# Patient Record
Sex: FEMALE | Race: WHITE | NOT HISPANIC OR LATINO | Employment: OTHER | URBAN - METROPOLITAN AREA
[De-identification: names, ages, dates, MRNs, and addresses within clinical notes are randomized per-mention and may not be internally consistent; named-entity substitution may affect disease eponyms.]

---

## 2021-03-12 ENCOUNTER — OFFICE VISIT (OUTPATIENT)
Dept: NEUROSURGERY | Facility: CLINIC | Age: 86
End: 2021-03-12

## 2021-03-12 VITALS
BODY MASS INDEX: 29.59 KG/M2 | TEMPERATURE: 96.9 F | WEIGHT: 167 LBS | DIASTOLIC BLOOD PRESSURE: 92 MMHG | HEIGHT: 63 IN | SYSTOLIC BLOOD PRESSURE: 142 MMHG

## 2021-03-12 DIAGNOSIS — S32.040A COMPRESSION FRACTURE OF L4 VERTEBRA, INITIAL ENCOUNTER (HCC): Primary | ICD-10-CM

## 2021-03-12 DIAGNOSIS — M54.50 ACUTE MIDLINE LOW BACK PAIN WITHOUT SCIATICA: ICD-10-CM

## 2021-03-12 PROCEDURE — 99203 OFFICE O/P NEW LOW 30 MIN: CPT | Performed by: PHYSICIAN ASSISTANT

## 2021-03-12 RX ORDER — ALLOPURINOL 100 MG/1
100 TABLET ORAL 2 TIMES DAILY
COMMUNITY
Start: 2021-02-04

## 2021-03-12 RX ORDER — MEMANTINE HYDROCHLORIDE 10 MG/1
10 TABLET ORAL DAILY
COMMUNITY
Start: 2021-02-04

## 2021-03-12 RX ORDER — HYDROCODONE BITARTRATE AND ACETAMINOPHEN 5; 325 MG/1; MG/1
1 TABLET ORAL EVERY 8 HOURS PRN
Qty: 25 TABLET | Refills: 0 | Status: SHIPPED | OUTPATIENT
Start: 2021-03-12 | End: 2021-03-22 | Stop reason: SDUPTHER

## 2021-03-12 RX ORDER — PREDNISONE 10 MG/1
10 TABLET ORAL DAILY
COMMUNITY
Start: 2021-02-04

## 2021-03-12 RX ORDER — NITROFURANTOIN MACROCRYSTALS 100 MG/1
100 CAPSULE ORAL DAILY
COMMUNITY
Start: 2021-02-04

## 2021-03-12 RX ORDER — ATORVASTATIN CALCIUM 40 MG/1
40 TABLET, FILM COATED ORAL NIGHTLY
COMMUNITY

## 2021-03-12 RX ORDER — HYDROCODONE BITARTRATE AND ACETAMINOPHEN 5; 325 MG/1; MG/1
TABLET ORAL
COMMUNITY
Start: 2021-03-09 | End: 2023-01-10

## 2021-03-12 RX ORDER — GLIPIZIDE 5 MG/1
5 TABLET ORAL DAILY
COMMUNITY
Start: 2021-02-04

## 2021-03-12 RX ORDER — POTASSIUM CHLORIDE 750 MG/1
10 CAPSULE, EXTENDED RELEASE ORAL DAILY
COMMUNITY

## 2021-03-16 ENCOUNTER — TELEPHONE (OUTPATIENT)
Dept: NEUROSURGERY | Facility: CLINIC | Age: 86
End: 2021-03-16

## 2021-03-16 NOTE — TELEPHONE ENCOUNTER
Caller: MAYELIN CORONA    Relationship to patient: DAUGHTER    Best call back number: [phone]    Patient is needing: PT HAD CT SCAN DONE AT [facility] IN [place] IN THE ED.  PTS FAMILY TRIED TO OBTAIN THESE MEDICAL RECORDS AND WERE UNABLE TO OBTAIN THEM WITHOUT POA.  [facility] STATED. THEY NEEDED A REQUEST FROM THE OFFICE CAN BE FAXED @[phone].  [facility] STATED THEY WOULD BE ABLE TO GET THEM TO THE OFFICE BY PTS APPT TIME TOMORROW.      PLEASE ADVISE  THANK YOU

## 2021-03-17 ENCOUNTER — OFFICE VISIT (OUTPATIENT)
Dept: NEUROSURGERY | Facility: CLINIC | Age: 86
End: 2021-03-17

## 2021-03-17 VITALS
SYSTOLIC BLOOD PRESSURE: 122 MMHG | HEIGHT: 63 IN | DIASTOLIC BLOOD PRESSURE: 82 MMHG | WEIGHT: 164 LBS | BODY MASS INDEX: 29.06 KG/M2 | TEMPERATURE: 97.8 F

## 2021-03-17 DIAGNOSIS — M54.50 ACUTE MIDLINE LOW BACK PAIN WITHOUT SCIATICA: ICD-10-CM

## 2021-03-17 DIAGNOSIS — S32.040A COMPRESSION FRACTURE OF L4 VERTEBRA, INITIAL ENCOUNTER (HCC): Primary | ICD-10-CM

## 2021-03-17 PROCEDURE — 99214 OFFICE O/P EST MOD 30 MIN: CPT | Performed by: PHYSICIAN ASSISTANT

## 2021-03-17 RX ORDER — SODIUM CHLORIDE 9 MG/ML
100 INJECTION, SOLUTION INTRAVENOUS CONTINUOUS
Status: CANCELLED | OUTPATIENT
Start: 2021-03-17

## 2021-03-17 RX ORDER — SODIUM CHLORIDE 0.9 % (FLUSH) 0.9 %
3-10 SYRINGE (ML) INJECTION AS NEEDED
Status: CANCELLED | OUTPATIENT
Start: 2021-03-17

## 2021-03-17 RX ORDER — SODIUM CHLORIDE 0.9 % (FLUSH) 0.9 %
3 SYRINGE (ML) INJECTION EVERY 12 HOURS SCHEDULED
Status: CANCELLED | OUTPATIENT
Start: 2021-03-17

## 2021-03-17 RX ORDER — FAMOTIDINE 10 MG
20 TABLET ORAL
Status: CANCELLED | OUTPATIENT
Start: 2021-03-17

## 2021-03-17 NOTE — PROGRESS NOTES
Patient: Justine Mancia  : 3/24/1933  Chart #: [id number]    Date of Service: 2021    CHIEF COMPLAINT: Back pain    History of Present Illness Ms. Mancia is seen in follow-up.  She is an 87-year-old woman who is known to Dr. Pyle's service for undergoing decompressive laminectomies L3-4, and L4-5 on 2013.  Remotely, () she underwent a lumbar fusion by Dr. Corley.  We do not have record of that.  About 3 weeks ago she developed increased low back pain.  No inciting accident or injury.  1 week ago, symptoms became severe prompting her to go to the emergency room.  A CT scan reportedly showed a compression deformity at the L4 vertebral body felt to be acute/subacute.  She was sent home with pain medication.  She continues to complain of significant low back pain.  She has unable to go without pain medication.  She denies pain, numbness or weakness into the lower extremities.  Symptoms are worse with activity-specifically bending, twisting, or moving from sitting to standing.  She feels better when lying down.  No bowel or bladder difficulties.  She has no known history of osteoporosis.  She is on chronic daily prednisone for osteoarthritis.     Past Medical History:   Diagnosis Date   • Arthritis    • Diabetes mellitus (CMS/[facility])    • Gout    • Hx of bladder infections    • Hypertension    • Low back pain    • UTI (urinary tract infection)          Current Outpatient Medications:   •  allopurinol (ZYLOPRIM) 100 MG tablet, , Disp: , Rfl:   •  apixaban (Eliquis) 5 MG tablet tablet, Every 12 (Twelve) Hours., Disp: , Rfl:   •  atorvastatin (Lipitor) 40 MG tablet, Lipitor 40 mg tablet  Take 1 tablet every day by oral route at bedtime., Disp: , Rfl:   •  glipizide (GLUCOTROL) 5 MG tablet, , Disp: , Rfl:   •  HYDROcodone-acetaminophen (NORCO) 5-325 MG per tablet, , Disp: , Rfl:   •  HYDROcodone-acetaminophen (NORCO) 5-325 MG per tablet, Take 1 tablet by mouth Every 8 (Eight) Hours As Needed for Moderate Pain  "., Disp: 25 tablet, Rfl: 0  •  memantine (NAMENDA) 10 MG tablet, , Disp: , Rfl:   •  Metoprolol Tartrate (LOPRESSOR PO), metoprolol tartrate  50 mg. daily, Disp: , Rfl:   •  nitrofurantoin (MACRODANTIN) 100 MG capsule, , Disp: , Rfl:   •  potassium chloride (MICRO-K) 10 MEQ CR capsule, potassium chloride  10 mg. daily, Disp: , Rfl:   •  predniSONE (DELTASONE) 10 MG tablet, , Disp: , Rfl:     Past Surgical History:   Procedure Laterality Date   • APPENDECTOMY     • CERVICAL FUSION  1980's   • LUMBAR FUSION  1970's    Dr. Corley   • LUMBAR LAMINECTOMY  2013    L3/4 and L4/5; Dr. Mars Pyle   • OTHER SURGICAL HISTORY      bone spur removal - foot       Social History     Socioeconomic History   • Marital status:      Spouse name: Not on file   • Number of children: Not on file   • Years of education: Not on file   • Highest education level: Not on file   Tobacco Use   • Smoking status: Never Smoker   • Smokeless tobacco: Never Used   Substance and Sexual Activity   • Alcohol use: Never   • Drug use: Never   • Sexual activity: Defer         Review of Systems   Musculoskeletal: Positive for back pain.   All other systems reviewed and are negative.      Objective   Vital Signs: Blood pressure 122/82, temperature 97.8 °F (36.6 °C), height 160 cm (63\"), weight 74.4 kg (164 lb).  Physical Exam   Vitals and nursing note reviewed.   Constitutional:       General: She is not in acute distress.     Appearance: She is well-developed.   HENT:      Head: Normocephalic and atraumatic.   Eyes:      Pupils: Pupils are equal, round, and reactive to light.   Cardiovascular:      Heart sounds: Normal heart sounds.   Pulmonary:      Breath sounds: Normal breath sounds.   Psychiatric:         Behavior: Behavior normal.         Thought Content: Thought content normal.   Musculoskeletal:     Strength is intact in upper and lower extremities to direct testing. Tenderness observed in the low back.      Station and gait are normal. " Ambulates with rolling walker.   Neurologic:     Muscle tone is normal throughout.     Coordination is intact.     Sensation is intact to light touch throughout.     Patient is oriented to person, place, and time.         Independent review of radiographic imaging: MRI of the lumbar spine demonstrates compression deformity with increased STIR signal consistent with acute/subacute compression fracture. Reviewed with Dr Pyle and with patient in the room.     Assessment/Plan   Diagnosis:  1. Acute/subacute compression fracture L4  2. Presumed osteoporosis.   3. Anticoagulated     Medical Decision Making: Patient has been struggling with pain for several weeks. Last week symptoms were so severe, she went to the emergency department.  I have recommended kyphoplasty to address her severe pain. Dr Pyle has met with her today.  The general nature of the procedure, as well as risks, limitations, and complications were discussed. She will need to come off Eliquis in the perioperative period.     Diagnoses and all orders for this visit:    1. Compression fracture of L4 vertebra, initial encounter (CMS/[facility]) (Primary)    2. Acute midline low back pain without sciatica    3. BMI 29.0-29.9,adult                          Patient's Body mass index is 29.05 kg/m². BMI is above normal parameters. Recommendations include: exercise counseling and nutrition counseling.         Enid Prescott PA-C  Patient Care Team:  Amilcar Arroyo MD as PCP - General (Family Medicine)

## 2021-03-17 NOTE — H&P
Patient: Justine Mancia  : 3/24/1933  Chart #: [id number]    Date of Service: 2021    CHIEF COMPLAINT: Back pain    History of Present Illness Ms. Mancia is seen in follow-up.  She is an 87-year-old woman who is known to Dr. Pyle's service for undergoing decompressive laminectomies L3-4, and L4-5 on 2013.  Remotely, () she underwent a lumbar fusion by Dr. Corley.  We do not have record of that.  About 3 weeks ago she developed increased low back pain.  No inciting accident or injury.  1 week ago, symptoms became severe prompting her to go to the emergency room.  A CT scan reportedly showed a compression deformity at the L4 vertebral body felt to be acute/subacute.  She was sent home with pain medication.  She continues to complain of significant low back pain.  She has unable to go without pain medication.  She denies pain, numbness or weakness into the lower extremities.  Symptoms are worse with activity-specifically bending, twisting, or moving from sitting to standing.  She feels better when lying down.  No bowel or bladder difficulties.  She has no known history of osteoporosis.  She is on chronic daily prednisone for osteoarthritis.       Past Medical History:   Diagnosis Date   • Arthritis    • Diabetes mellitus (CMS/[facility])    • Gout    • Hx of bladder infections    • Hypertension    • Low back pain    • UTI (urinary tract infection)          Current Outpatient Medications:   •  allopurinol (ZYLOPRIM) 100 MG tablet, , Disp: , Rfl:   •  apixaban (Eliquis) 5 MG tablet tablet, Every 12 (Twelve) Hours., Disp: , Rfl:   •  atorvastatin (Lipitor) 40 MG tablet, Lipitor 40 mg tablet  Take 1 tablet every day by oral route at bedtime., Disp: , Rfl:   •  glipizide (GLUCOTROL) 5 MG tablet, , Disp: , Rfl:   •  HYDROcodone-acetaminophen (NORCO) 5-325 MG per tablet, , Disp: , Rfl:   •  HYDROcodone-acetaminophen (NORCO) 5-325 MG per tablet, Take 1 tablet by mouth Every 8 (Eight) Hours As Needed for Moderate  "Pain ., Disp: 25 tablet, Rfl: 0  •  memantine (NAMENDA) 10 MG tablet, , Disp: , Rfl:   •  Metoprolol Tartrate (LOPRESSOR PO), metoprolol tartrate  50 mg. daily, Disp: , Rfl:   •  nitrofurantoin (MACRODANTIN) 100 MG capsule, , Disp: , Rfl:   •  potassium chloride (MICRO-K) 10 MEQ CR capsule, potassium chloride  10 mg. daily, Disp: , Rfl:   •  predniSONE (DELTASONE) 10 MG tablet, , Disp: , Rfl:     Past Surgical History:   Procedure Laterality Date   • APPENDECTOMY     • CERVICAL FUSION  1980's   • LUMBAR FUSION  1970's    Dr. Corley   • LUMBAR LAMINECTOMY  2013    L3/4 and L4/5; Dr. Mars Pyle   • OTHER SURGICAL HISTORY      bone spur removal - foot       Social History     Socioeconomic History   • Marital status:      Spouse name: Not on file   • Number of children: Not on file   • Years of education: Not on file   • Highest education level: Not on file   Tobacco Use   • Smoking status: Never Smoker   • Smokeless tobacco: Never Used   Substance and Sexual Activity   • Alcohol use: Never   • Drug use: Never   • Sexual activity: Defer         Review of Systems   Musculoskeletal: Positive for back pain.   All other systems reviewed and are negative.      Objective   Vital Signs: Blood pressure 122/82, temperature 97.8 °F (36.6 °C), height 160 cm (63\"), weight 74.4 kg (164 lb).  Physical Exam   Vitals and nursing note reviewed.   Constitutional:       General: She is not in acute distress.     Appearance: She is well-developed.   HENT:      Head: Normocephalic and atraumatic.   Eyes:      Pupils: Pupils are equal, round, and reactive to light.   Cardiovascular:      Heart sounds: Normal heart sounds.   Pulmonary:      Breath sounds: Normal breath sounds.   Psychiatric:         Behavior: Behavior normal.         Thought Content: Thought content normal.   Musculoskeletal:     Strength is intact in upper and lower extremities to direct testing. Tenderness observed in the low back.      Station and gait are normal. " Ambulates with rolling walker.      Straight leg raising is negative.   Neurologic:     Muscle tone is normal throughout.     Coordination is intact.     Sensation is intact to light touch throughout.     Patient is oriented to person, place, and time.         Independent review of radiographic imaging: MRI of the lumbar spine demonstrates compression deformity with increased STIR signal consistent with acute/subacute compression fracture. Reviewed with Dr Pyle and with patient in the room.     Assessment/Plan   Diagnosis:  1. Acute/subacute compression fracture L4  2. Presumed osteoporosis.   3. Anticoagulated     Medical Decision Making: Patient has been struggling with pain for several weeks. Last week symptoms were so severe, she went to the emergency department.  I have recommended kyphoplasty to address her severe pain. Dr Pyle has met with her today.  The general nature of the procedure, as well as risks, limitations, and complications were discussed. She will need to come off Eliquis in the perioperative period.     Diagnoses and all orders for this visit:    1. Compression fracture of L4 vertebra, initial encounter (CMS/[facility]) (Primary)    2. Acute midline low back pain without sciatica    3. BMI 29.0-29.9,adult                          Patient's Body mass index is 29.05 kg/m². BMI is above normal parameters. Recommendations include: exercise counseling and nutrition counseling.         Enid Prescott PA-C  Patient Care Team:  Amilcar Arroyo MD as PCP - General (Family Medicine)

## 2021-03-18 PROBLEM — S32.040A COMPRESSION FRACTURE OF FOURTH LUMBAR VERTEBRA: Status: ACTIVE | Noted: 2021-03-18

## 2021-03-22 DIAGNOSIS — S32.040A COMPRESSION FRACTURE OF L4 VERTEBRA, INITIAL ENCOUNTER (HCC): ICD-10-CM

## 2021-03-22 RX ORDER — HYDROCODONE BITARTRATE AND ACETAMINOPHEN 5; 325 MG/1; MG/1
1 TABLET ORAL EVERY 8 HOURS PRN
Qty: 25 TABLET | Refills: 0 | Status: SHIPPED | OUTPATIENT
Start: 2021-03-22 | End: 2023-01-10

## 2021-03-22 NOTE — TELEPHONE ENCOUNTER
Provider:  Edenilson  Caller: hailey  Time of call:   9:30  Phone #:  [phone]  Surgery:  Upcoming-kyphoplasty L4  Surgery Date: upcoming 03/29/21  Last visit:   surgery  Next visit:     YAZMIN:     03/05/2021 Hydrocodone/Acetaminophen  325MG/5MG  03/24/1933 15 3 ESTHER RUSSELL [facility] [address] 25 1  03/09/2021 Hydrocodone/Acetaminophen  325MG/5MG  03/24/1933 20 5 DANNY PACHECO [facility] [address] 20 1  03/13/2021 Hydrocodone/Acetaminophen  325MG/5MG  03/24/1933 25 8 EDENILSON CORADO [facility] [address] 16 1    Reason for call:     Patient requests refill on Norco 5/325 mg.

## 2021-03-26 ENCOUNTER — APPOINTMENT (OUTPATIENT)
Dept: PREADMISSION TESTING | Facility: HOSPITAL | Age: 86
End: 2021-03-26

## 2021-03-26 VITALS — BODY MASS INDEX: 27.89 KG/M2 | WEIGHT: 163.36 LBS | HEIGHT: 64 IN

## 2021-03-26 LAB — MRSA DNA SPEC QL NAA+PROBE: POSITIVE

## 2021-03-26 PROCEDURE — C9803 HOPD COVID-19 SPEC COLLECT: HCPCS

## 2021-03-26 PROCEDURE — 87641 MR-STAPH DNA AMP PROBE: CPT

## 2021-03-26 PROCEDURE — 85025 COMPLETE CBC W/AUTO DIFF WBC: CPT | Performed by: PHYSICIAN ASSISTANT

## 2021-03-26 PROCEDURE — U0004 COV-19 TEST NON-CDC HGH THRU: HCPCS

## 2021-03-26 PROCEDURE — 80053 COMPREHEN METABOLIC PANEL: CPT | Performed by: PHYSICIAN ASSISTANT

## 2021-03-26 RX ORDER — VANCOMYCIN HYDROCHLORIDE 1 G/200ML
15 INJECTION, SOLUTION INTRAVENOUS ONCE
Status: CANCELLED | OUTPATIENT
Start: 2021-03-29

## 2021-03-26 RX ORDER — METOPROLOL SUCCINATE 50 MG/1
25 TABLET, EXTENDED RELEASE ORAL 2 TIMES DAILY
COMMUNITY

## 2021-03-26 NOTE — PAT
ekg cleared per dr Connell.    Patient to apply Chlorhexadine wipes  to surgical area (as instructed) the night before procedure and the AM of procedure. Wipes provided.    Patient instructed to drink 20 ounces (or until full) of Gatorade and it needs to be completed 1 hour before given arrival time for procedure (NO RED Gatorade)    Patient verbalized understanding.    Verified with patient that they received a prescription for Bactroban and Chlorhexidine shower from the office.  Reinforced with them to fill the prescription if they haven't already.  Verbal and written instructions given regarding proper use of the Bactroban and Chlorhexidine to patient and/or famlily during PAT visit. Patient/family also instructed to complete checklist and return it to Pre-op on the day of surgery.  Patient and/or family verbalized understanding.      covid test performed prior to pat appt today    Patient instructed to stop Eliquis 3 days prior to procedure. Last dose 3/25/2021. Note in chart from PCP.     Colon screening information booklet given to patient during PAT visit

## 2021-03-27 LAB — SARS-COV-2 RNA NOSE QL NAA+PROBE: NOT DETECTED

## 2021-03-29 ENCOUNTER — HOSPITAL ENCOUNTER (OUTPATIENT)
Facility: HOSPITAL | Age: 86
Setting detail: HOSPITAL OUTPATIENT SURGERY
Discharge: HOME | End: 2021-03-29
Attending: NEUROLOGICAL SURGERY | Admitting: NEUROLOGICAL SURGERY

## 2021-03-29 ENCOUNTER — APPOINTMENT (OUTPATIENT)
Dept: GENERAL RADIOLOGY | Facility: HOSPITAL | Age: 86
End: 2021-03-29

## 2021-03-29 ENCOUNTER — ANESTHESIA EVENT (OUTPATIENT)
Dept: PERIOP | Facility: HOSPITAL | Age: 86
End: 2021-03-29

## 2021-03-29 ENCOUNTER — ANESTHESIA (OUTPATIENT)
Dept: PERIOP | Facility: HOSPITAL | Age: 86
End: 2021-03-29

## 2021-03-29 VITALS
TEMPERATURE: 97.4 F | DIASTOLIC BLOOD PRESSURE: 88 MMHG | WEIGHT: 163 LBS | HEIGHT: 64 IN | HEART RATE: 74 BPM | OXYGEN SATURATION: 93 % | BODY MASS INDEX: 27.83 KG/M2 | SYSTOLIC BLOOD PRESSURE: 135 MMHG | RESPIRATION RATE: 14 BRPM

## 2021-03-29 DIAGNOSIS — S32.040A COMPRESSION FRACTURE OF L4 VERTEBRA, INITIAL ENCOUNTER (HCC): ICD-10-CM

## 2021-03-29 LAB — GLUCOSE BLDC GLUCOMTR-MCNC: 110 MG/DL (ref 70–130)

## 2021-03-29 PROCEDURE — 25010000002 FENTANYL CITRATE (PF) 100 MCG/2ML SOLUTION: Performed by: NURSE ANESTHETIST, CERTIFIED REGISTERED

## 2021-03-29 PROCEDURE — 0 IOPAMIDOL 41 % SOLUTION: Performed by: NEUROLOGICAL SURGERY

## 2021-03-29 PROCEDURE — C1713 ANCHOR/SCREW BN/BN,TIS/BN: HCPCS | Performed by: NEUROLOGICAL SURGERY

## 2021-03-29 PROCEDURE — 22514 PERQ VERTEBRAL AUGMENTATION: CPT | Performed by: NEUROLOGICAL SURGERY

## 2021-03-29 PROCEDURE — 25010000002 NEOSTIGMINE 10 MG/10ML SOLUTION: Performed by: NURSE ANESTHETIST, CERTIFIED REGISTERED

## 2021-03-29 PROCEDURE — 22510 PERQ CERVICOTHORACIC INJECT: CPT

## 2021-03-29 PROCEDURE — 25010000002 VANCOMYCIN PER 500 MG: Performed by: PHYSICIAN ASSISTANT

## 2021-03-29 PROCEDURE — 82962 GLUCOSE BLOOD TEST: CPT

## 2021-03-29 PROCEDURE — 25010000002 ONDANSETRON PER 1 MG: Performed by: NURSE ANESTHETIST, CERTIFIED REGISTERED

## 2021-03-29 PROCEDURE — 25010000002 PROPOFOL 10 MG/ML EMULSION: Performed by: NURSE ANESTHETIST, CERTIFIED REGISTERED

## 2021-03-29 DEVICE — CEMENT C01A KYPHX HV-R BONE CEMENT EN
Type: IMPLANTABLE DEVICE | Site: SPINE LUMBAR | Status: FUNCTIONAL
Brand: KYPHON® HV-R® BONE CEMENT

## 2021-03-29 RX ORDER — MAGNESIUM HYDROXIDE 1200 MG/15ML
LIQUID ORAL AS NEEDED
Status: DISCONTINUED | OUTPATIENT
Start: 2021-03-29 | End: 2021-03-29 | Stop reason: HOSPADM

## 2021-03-29 RX ORDER — SODIUM CHLORIDE, SODIUM LACTATE, POTASSIUM CHLORIDE, CALCIUM CHLORIDE 600; 310; 30; 20 MG/100ML; MG/100ML; MG/100ML; MG/100ML
9 INJECTION, SOLUTION INTRAVENOUS CONTINUOUS PRN
Status: DISCONTINUED | OUTPATIENT
Start: 2021-03-29 | End: 2021-03-29 | Stop reason: HOSPADM

## 2021-03-29 RX ORDER — ONDANSETRON 2 MG/ML
INJECTION INTRAMUSCULAR; INTRAVENOUS AS NEEDED
Status: DISCONTINUED | OUTPATIENT
Start: 2021-03-29 | End: 2021-03-29 | Stop reason: SURG

## 2021-03-29 RX ORDER — GLYCOPYRROLATE 0.2 MG/ML
INJECTION INTRAMUSCULAR; INTRAVENOUS AS NEEDED
Status: DISCONTINUED | OUTPATIENT
Start: 2021-03-29 | End: 2021-03-29 | Stop reason: SURG

## 2021-03-29 RX ORDER — FENTANYL CITRATE 50 UG/ML
50 INJECTION, SOLUTION INTRAMUSCULAR; INTRAVENOUS
Status: DISCONTINUED | OUTPATIENT
Start: 2021-03-29 | End: 2021-03-29 | Stop reason: HOSPADM

## 2021-03-29 RX ORDER — SODIUM CHLORIDE 0.9 % (FLUSH) 0.9 %
3 SYRINGE (ML) INJECTION EVERY 12 HOURS SCHEDULED
Status: DISCONTINUED | OUTPATIENT
Start: 2021-03-29 | End: 2021-03-29 | Stop reason: HOSPADM

## 2021-03-29 RX ORDER — MIDAZOLAM HYDROCHLORIDE 1 MG/ML
1 INJECTION INTRAMUSCULAR; INTRAVENOUS
Status: DISCONTINUED | OUTPATIENT
Start: 2021-03-29 | End: 2021-03-29 | Stop reason: HOSPADM

## 2021-03-29 RX ORDER — VANCOMYCIN HYDROCHLORIDE 1 G/200ML
15 INJECTION, SOLUTION INTRAVENOUS ONCE
Status: COMPLETED | OUTPATIENT
Start: 2021-03-29 | End: 2021-03-29

## 2021-03-29 RX ORDER — EPHEDRINE SULFATE 50 MG/ML
5 INJECTION, SOLUTION INTRAVENOUS ONCE AS NEEDED
Status: DISCONTINUED | OUTPATIENT
Start: 2021-03-29 | End: 2021-03-29 | Stop reason: HOSPADM

## 2021-03-29 RX ORDER — PROMETHAZINE HYDROCHLORIDE 25 MG/1
25 TABLET ORAL ONCE AS NEEDED
Status: DISCONTINUED | OUTPATIENT
Start: 2021-03-29 | End: 2021-03-29 | Stop reason: HOSPADM

## 2021-03-29 RX ORDER — BUPIVACAINE HCL/0.9 % NACL/PF 0.125 %
PLASTIC BAG, INJECTION (ML) EPIDURAL AS NEEDED
Status: DISCONTINUED | OUTPATIENT
Start: 2021-03-29 | End: 2021-03-29 | Stop reason: SURG

## 2021-03-29 RX ORDER — SODIUM CHLORIDE 9 MG/ML
100 INJECTION, SOLUTION INTRAVENOUS CONTINUOUS
Status: DISCONTINUED | OUTPATIENT
Start: 2021-03-29 | End: 2021-03-29 | Stop reason: HOSPADM

## 2021-03-29 RX ORDER — SODIUM CHLORIDE 0.9 % (FLUSH) 0.9 %
10 SYRINGE (ML) INJECTION EVERY 12 HOURS SCHEDULED
Status: DISCONTINUED | OUTPATIENT
Start: 2021-03-29 | End: 2021-03-29 | Stop reason: HOSPADM

## 2021-03-29 RX ORDER — EPHEDRINE SULFATE 50 MG/ML
INJECTION, SOLUTION INTRAVENOUS AS NEEDED
Status: DISCONTINUED | OUTPATIENT
Start: 2021-03-29 | End: 2021-03-29 | Stop reason: SURG

## 2021-03-29 RX ORDER — FENTANYL CITRATE 50 UG/ML
INJECTION, SOLUTION INTRAMUSCULAR; INTRAVENOUS AS NEEDED
Status: DISCONTINUED | OUTPATIENT
Start: 2021-03-29 | End: 2021-03-29 | Stop reason: SURG

## 2021-03-29 RX ORDER — PROMETHAZINE HYDROCHLORIDE 25 MG/1
25 SUPPOSITORY RECTAL ONCE AS NEEDED
Status: DISCONTINUED | OUTPATIENT
Start: 2021-03-29 | End: 2021-03-29 | Stop reason: HOSPADM

## 2021-03-29 RX ORDER — SODIUM CHLORIDE 0.9 % (FLUSH) 0.9 %
10 SYRINGE (ML) INJECTION AS NEEDED
Status: DISCONTINUED | OUTPATIENT
Start: 2021-03-29 | End: 2021-03-29 | Stop reason: HOSPADM

## 2021-03-29 RX ORDER — LIDOCAINE HYDROCHLORIDE 10 MG/ML
0.5 INJECTION, SOLUTION EPIDURAL; INFILTRATION; INTRACAUDAL; PERINEURAL ONCE AS NEEDED
Status: COMPLETED | OUTPATIENT
Start: 2021-03-29 | End: 2021-03-29

## 2021-03-29 RX ORDER — ROCURONIUM BROMIDE 10 MG/ML
INJECTION, SOLUTION INTRAVENOUS AS NEEDED
Status: DISCONTINUED | OUTPATIENT
Start: 2021-03-29 | End: 2021-03-29 | Stop reason: SURG

## 2021-03-29 RX ORDER — SODIUM CHLORIDE 0.9 % (FLUSH) 0.9 %
3-10 SYRINGE (ML) INJECTION AS NEEDED
Status: DISCONTINUED | OUTPATIENT
Start: 2021-03-29 | End: 2021-03-29 | Stop reason: HOSPADM

## 2021-03-29 RX ORDER — PROPOFOL 10 MG/ML
VIAL (ML) INTRAVENOUS AS NEEDED
Status: DISCONTINUED | OUTPATIENT
Start: 2021-03-29 | End: 2021-03-29 | Stop reason: SURG

## 2021-03-29 RX ORDER — MIDAZOLAM HYDROCHLORIDE 1 MG/ML
0.5 INJECTION INTRAMUSCULAR; INTRAVENOUS
Status: DISCONTINUED | OUTPATIENT
Start: 2021-03-29 | End: 2021-03-29 | Stop reason: HOSPADM

## 2021-03-29 RX ORDER — FAMOTIDINE 20 MG/1
20 TABLET, FILM COATED ORAL
Status: COMPLETED | OUTPATIENT
Start: 2021-03-29 | End: 2021-03-29

## 2021-03-29 RX ORDER — NEOSTIGMINE METHYLSULFATE 1 MG/ML
INJECTION, SOLUTION INTRAVENOUS AS NEEDED
Status: DISCONTINUED | OUTPATIENT
Start: 2021-03-29 | End: 2021-03-29 | Stop reason: SURG

## 2021-03-29 RX ADMIN — NEOSTIGMINE 4 MG: 1 INJECTION INTRAVENOUS at 13:39

## 2021-03-29 RX ADMIN — Medication 100 MCG: at 13:06

## 2021-03-29 RX ADMIN — GLYCOPYRROLATE 0.4 MG: 0.4 INJECTION INTRAMUSCULAR; INTRAVENOUS at 13:39

## 2021-03-29 RX ADMIN — EPHEDRINE SULFATE 15 MG: 50 INJECTION, SOLUTION INTRAVENOUS at 13:13

## 2021-03-29 RX ADMIN — ONDANSETRON 4 MG: 2 INJECTION INTRAMUSCULAR; INTRAVENOUS at 13:39

## 2021-03-29 RX ADMIN — EPHEDRINE SULFATE 15 MG: 50 INJECTION, SOLUTION INTRAVENOUS at 13:17

## 2021-03-29 RX ADMIN — ROCURONIUM BROMIDE 20 MG: 10 INJECTION INTRAVENOUS at 13:06

## 2021-03-29 RX ADMIN — SODIUM CHLORIDE, POTASSIUM CHLORIDE, SODIUM LACTATE AND CALCIUM CHLORIDE 9 ML/HR: 600; 310; 30; 20 INJECTION, SOLUTION INTRAVENOUS at 11:45

## 2021-03-29 RX ADMIN — LIDOCAINE HYDROCHLORIDE 0.5 ML: 10 INJECTION, SOLUTION EPIDURAL; INFILTRATION; INTRACAUDAL; PERINEURAL at 12:05

## 2021-03-29 RX ADMIN — FENTANYL CITRATE 50 MCG: 50 INJECTION, SOLUTION INTRAMUSCULAR; INTRAVENOUS at 14:09

## 2021-03-29 RX ADMIN — FAMOTIDINE 20 MG: 20 TABLET ORAL at 12:04

## 2021-03-29 RX ADMIN — FENTANYL CITRATE 50 MCG: 50 INJECTION, SOLUTION INTRAMUSCULAR; INTRAVENOUS at 13:25

## 2021-03-29 RX ADMIN — VANCOMYCIN HYDROCHLORIDE 1000 MG: 1 INJECTION, SOLUTION INTRAVENOUS at 11:43

## 2021-03-29 RX ADMIN — FENTANYL CITRATE 50 MCG: 50 INJECTION, SOLUTION INTRAMUSCULAR; INTRAVENOUS at 14:00

## 2021-03-29 RX ADMIN — PROPOFOL 100 MG: 10 INJECTION, EMULSION INTRAVENOUS at 13:06

## 2021-03-29 RX ADMIN — FENTANYL CITRATE 50 MCG: 50 INJECTION, SOLUTION INTRAMUSCULAR; INTRAVENOUS at 13:06

## 2021-04-16 ENCOUNTER — OFFICE VISIT (OUTPATIENT)
Dept: NEUROSURGERY | Facility: CLINIC | Age: 86
End: 2021-04-16

## 2021-04-16 VITALS
HEIGHT: 64 IN | DIASTOLIC BLOOD PRESSURE: 80 MMHG | TEMPERATURE: 97.1 F | WEIGHT: 166.2 LBS | SYSTOLIC BLOOD PRESSURE: 140 MMHG | BODY MASS INDEX: 28.38 KG/M2

## 2021-04-16 DIAGNOSIS — Z98.890 S/P KYPHOPLASTY: Primary | ICD-10-CM

## 2021-04-16 DIAGNOSIS — S32.040D COMPRESSION FRACTURE OF L4 VERTEBRA WITH ROUTINE HEALING, SUBSEQUENT ENCOUNTER: ICD-10-CM

## 2021-04-16 PROCEDURE — 99212 OFFICE O/P EST SF 10 MIN: CPT | Performed by: PHYSICIAN ASSISTANT

## 2021-04-16 NOTE — PROGRESS NOTES
Patient: Justine Mancia  : 3/24/1933  GENDER: female    Primary Care Provider: Amilcar Arroyo MD    Requesting Provider: As above      History    Chief Complaint: back pain     History of Present Illness: Ms. Mancia is an 88-year-old female with a known history of osteoporosis.  She is known to Dr. Pyle's service for undergoing decompressive laminectomies from L3-5 on 2013.  Her history is also significant for a lumbar fusion by Dr. Corley in the early 1970s.  More recently, she presented to our service with severe low back pain.  Preoperative studies revealed an L4 compression fracture.  As such, patient presented for an L4 kyphoplasty on 3/29/2021.    Presently, Ms. Mancia is 2.5 weeks postop.  She is overall pleased with her current postoperative progress.  She notes a 70% improvement in her preoperative complaints overall.  She no longer requires the use of postoperative pain medication.  She takes the occasional Tylenol as needed.  She has returned to some light activity without interference.  She has no other complaints at this time.    Review of Systems   Constitutional: Negative for activity change, appetite change, chills, diaphoresis, fatigue, fever and unexpected weight change.   HENT: Negative for congestion, dental problem, drooling, ear discharge, ear pain, facial swelling, hearing loss, mouth sores, nosebleeds, postnasal drip, rhinorrhea, sinus pressure, sinus pain, sneezing, sore throat, tinnitus, trouble swallowing and voice change.    Eyes: Negative for photophobia, pain, discharge, redness, itching and visual disturbance.   Respiratory: Negative for apnea, cough, choking, chest tightness, shortness of breath, wheezing and stridor.    Cardiovascular: Negative for chest pain, palpitations and leg swelling.   Gastrointestinal: Negative for abdominal distention, abdominal pain, anal bleeding, blood in stool, constipation, diarrhea, nausea, rectal pain and vomiting.   Endocrine: Negative for  cold intolerance, heat intolerance, polydipsia, polyphagia and polyuria.   Genitourinary: Negative for decreased urine volume, difficulty urinating, dysuria, enuresis, flank pain, frequency, genital sores, hematuria and urgency.   Musculoskeletal: Positive for back pain. Negative for arthralgias, gait problem, joint swelling, myalgias, neck pain and neck stiffness.        Joint pain     Skin: Negative for color change, pallor, rash and wound.   Allergic/Immunologic: Negative for environmental allergies, food allergies and immunocompromised state.   Neurological: Negative for dizziness, tremors, seizures, syncope, facial asymmetry, speech difficulty, weakness, light-headedness, numbness and headaches.   Hematological: Negative for adenopathy. Does not bruise/bleed easily.   Psychiatric/Behavioral: Negative for agitation, behavioral problems, confusion, decreased concentration, dysphoric mood, hallucinations, self-injury, sleep disturbance and suicidal ideas. The patient is not nervous/anxious and is not hyperactive.        Past Medical History:   Diagnosis Date   • Arthritis    • Atrial fibrillation (CMS/HCC)    • Dementia (CMS/[facility])    • Diabetes mellitus (CMS/HCC)     dx: approx 3 yrs ago, checks bs twice weekly    • Full dentures     will remove before surgery   • Gout    • History of pulmonary embolus (PE)     post hip replacement   • Hx of bladder infections    • Hyperlipidemia    • Hypertension    • Low back pain    • Osteoporosis    • UTI (urinary tract infection)    • Wears glasses      Past Surgical History:   Procedure Laterality Date   • APPENDECTOMY     • CATARACT EXTRACTION Bilateral    • CERVICAL FUSION  1980's   • KYPHOPLASTY N/A 3/29/2021    Procedure: KYPHOPLASTY L4;  Surgeon: Mars Pyle MD;  Location: [facility];  Service: Neurosurgery;  Laterality: N/A;   • LAPAROSCOPIC TUBAL LIGATION     • LUMBAR FUSION  1970's    Dr. Corley   • LUMBAR LAMINECTOMY  2013    L3/4 and L4/5; Dr. Mars Pyle   •  "OTHER SURGICAL HISTORY Right     bone spur removal - foot       Current Outpatient Medications:   •  allopurinol (ZYLOPRIM) 100 MG tablet, Take 100 mg by mouth 2 (Two) Times a Day., Disp: , Rfl:   •  atorvastatin (Lipitor) 40 MG tablet, Take 40 mg by mouth Every Night., Disp: , Rfl:   •  Calcium Carbonate-Vitamin D (CALTRATE 600+D PO), Take 1 tablet by mouth Daily. 800 IU VIT D3, Disp: , Rfl:   •  glipizide (GLUCOTROL) 5 MG tablet, Take 5 mg by mouth Daily., Disp: , Rfl:   •  HYDROcodone-acetaminophen (NORCO) 5-325 MG per tablet, , Disp: , Rfl:   •  HYDROcodone-acetaminophen (NORCO) 5-325 MG per tablet, Take 1 tablet by mouth Every 8 (Eight) Hours As Needed for Moderate Pain ., Disp: 25 tablet, Rfl: 0  •  memantine (NAMENDA) 10 MG tablet, Take 10 mg by mouth Daily., Disp: , Rfl:   •  metoprolol succinate XL (TOPROL-XL) 50 MG 24 hr tablet, Take 25 mg by mouth 2 (two) times a day. Cut in half, Disp: , Rfl:   •  nitrofurantoin (MACRODANTIN) 100 MG capsule, Take 100 mg by mouth Daily. Maintenance for frequent uti, Disp: , Rfl:   •  potassium chloride (MICRO-K) 10 MEQ CR capsule, Take 10 mEq by mouth Daily., Disp: , Rfl:   •  predniSONE (DELTASONE) 10 MG tablet, Take 10 mg by mouth Daily., Disp: , Rfl:     Allergies   Allergen Reactions   • Aspirin GI Intolerance       The patient's review of systems, past medical history, past surgical history, family history, and social history have been reviewed at length in the electronic medical record.    Physical Exam:   /80 (BP Location: Right arm, Patient Position: Sitting, Cuff Size: Adult)   Temp 97.1 °F (36.2 °C)   Ht 162.6 cm (64\")   Wt 75.4 kg (166 lb 3.2 oz)   BMI 28.53 kg/m²   Consitutional: A&Ox3, pleasant, no acute distress  Skin:   - Well healed surgical incision   - No evidence of wound dehiscence  - NSOI   - Non-TTP    Patient's Body mass index is 28.53 kg/m². BMI is above normal parameters. Recommendations include: educational material, exercise counseling " and nutrition counseling.         Medical Decision Making    Data Review:   - reviewed intraoperative images     Diagnosis/Treatment Options:  1. S/P kyphoplasty  2. Compression fracture of L4 vertebra with routine healing, subsequent encounter  3. BMI 28.0-28.9,adult       Follow up:  Ms. Mancia is seen today in follow-up 2.5 weeks after undergoing an uncomplicated L4 kyphoplasty on 3/29/2021.  Fortunately she notes a 70% improvement in her discomfort overall.  We went over some general do's/don'ts moving forward.  Patient verbalized understanding.  She will continue to observe and will be seen back in the office on an as-needed basis.    Catarina Roberson PA-C   4/16/2021   14:55 EDT

## 2021-04-16 NOTE — PATIENT INSTRUCTIONS

## 2022-12-30 ENCOUNTER — HOSPITAL ENCOUNTER (OUTPATIENT)
Dept: GENERAL RADIOLOGY | Facility: HOSPITAL | Age: 87
Discharge: HOME | End: 2022-12-30

## 2022-12-30 ENCOUNTER — TRANSCRIBE ORDERS (OUTPATIENT)
Dept: ADMINISTRATIVE | Facility: HOSPITAL | Age: 87
End: 2022-12-30
Payer: MEDICARE

## 2022-12-30 DIAGNOSIS — K29.00 ACUTE GASTRITIS WITHOUT HEMORRHAGE, UNSPECIFIED GASTRITIS TYPE: Primary | ICD-10-CM

## 2022-12-30 DIAGNOSIS — M54.9 BACK PAIN, UNSPECIFIED BACK LOCATION, UNSPECIFIED BACK PAIN LATERALITY, UNSPECIFIED CHRONICITY: ICD-10-CM

## 2022-12-30 PROCEDURE — 72100 X-RAY EXAM L-S SPINE 2/3 VWS: CPT

## 2022-12-30 PROCEDURE — 74019 RADEX ABDOMEN 2 VIEWS: CPT

## 2023-01-01 ENCOUNTER — HOSPITAL ENCOUNTER (INPATIENT)
Facility: HOSPITAL | Age: 88
LOS: 1 days | End: 2023-06-16
Attending: FAMILY MEDICINE | Admitting: FAMILY MEDICINE
Payer: COMMERCIAL

## 2023-01-01 ENCOUNTER — APPOINTMENT (OUTPATIENT)
Dept: CT IMAGING | Facility: HOSPITAL | Age: 88
End: 2023-01-01
Payer: MEDICARE

## 2023-01-01 ENCOUNTER — APPOINTMENT (OUTPATIENT)
Dept: GENERAL RADIOLOGY | Facility: HOSPITAL | Age: 88
End: 2023-01-01
Payer: MEDICARE

## 2023-01-01 ENCOUNTER — HOSPITAL ENCOUNTER (INPATIENT)
Facility: HOSPITAL | Age: 88
LOS: 1 days | End: 2023-06-15
Attending: EMERGENCY MEDICINE | Admitting: INTERNAL MEDICINE
Payer: MEDICARE

## 2023-01-01 VITALS
TEMPERATURE: 97.4 F | RESPIRATION RATE: 16 BRPM | OXYGEN SATURATION: 99 % | BODY MASS INDEX: 26.46 KG/M2 | SYSTOLIC BLOOD PRESSURE: 150 MMHG | HEART RATE: 97 BPM | HEIGHT: 64 IN | DIASTOLIC BLOOD PRESSURE: 102 MMHG | WEIGHT: 155 LBS

## 2023-01-01 VITALS
DIASTOLIC BLOOD PRESSURE: 71 MMHG | WEIGHT: 154.98 LBS | RESPIRATION RATE: 16 BRPM | TEMPERATURE: 97.7 F | HEIGHT: 64 IN | SYSTOLIC BLOOD PRESSURE: 161 MMHG | BODY MASS INDEX: 26.46 KG/M2 | HEART RATE: 103 BPM | OXYGEN SATURATION: 99 %

## 2023-01-01 DIAGNOSIS — I10 ELEVATED BLOOD PRESSURE READING WITH DIAGNOSIS OF HYPERTENSION: ICD-10-CM

## 2023-01-01 DIAGNOSIS — Z79.01 CHRONIC ANTICOAGULATION: ICD-10-CM

## 2023-01-01 DIAGNOSIS — I61.0 NONTRAUMATIC SUBCORTICAL HEMORRHAGE OF LEFT CEREBRAL HEMISPHERE: Primary | ICD-10-CM

## 2023-01-01 DIAGNOSIS — Z66 DNR (DO NOT RESUSCITATE): ICD-10-CM

## 2023-01-01 DIAGNOSIS — I48.20 CHRONIC ATRIAL FIBRILLATION: ICD-10-CM

## 2023-01-01 LAB
ALT SERPL W P-5'-P-CCNC: 60 U/L (ref 1–33)
APTT PPP: 29.7 SECONDS (ref 22–39)
AST SERPL-CCNC: 54 U/L (ref 1–32)
BASOPHILS # BLD AUTO: 0.04 10*3/MM3 (ref 0–0.2)
BASOPHILS NFR BLD AUTO: 0.3 % (ref 0–1.5)
CREAT BLDA-MCNC: 0.8 MG/DL (ref 0.6–1.3)
DEPRECATED RDW RBC AUTO: 58.4 FL (ref 37–54)
EOSINOPHIL # BLD AUTO: 0.17 10*3/MM3 (ref 0–0.4)
EOSINOPHIL NFR BLD AUTO: 1.3 % (ref 0.3–6.2)
ERYTHROCYTE [DISTWIDTH] IN BLOOD BY AUTOMATED COUNT: 15.3 % (ref 12.3–15.4)
GEN 5 2HR TROPONIN T REFLEX: 81 NG/L
HCT VFR BLD AUTO: 38.5 % (ref 34–46.6)
HGB BLD-MCNC: 12.6 G/DL (ref 12–15.9)
HOLD SPECIMEN: NORMAL
IMM GRANULOCYTES # BLD AUTO: 0.06 10*3/MM3 (ref 0–0.05)
IMM GRANULOCYTES NFR BLD AUTO: 0.5 % (ref 0–0.5)
INR PPP: 1.6 (ref 0.8–1.2)
INR PPP: 2 (ref 0.9–1.1)
LYMPHOCYTES # BLD AUTO: 2.62 10*3/MM3 (ref 0.7–3.1)
LYMPHOCYTES NFR BLD AUTO: 20.6 % (ref 19.6–45.3)
MCH RBC QN AUTO: 34.1 PG (ref 26.6–33)
MCHC RBC AUTO-ENTMCNC: 32.7 G/DL (ref 31.5–35.7)
MCV RBC AUTO: 104.1 FL (ref 79–97)
MONOCYTES # BLD AUTO: 0.85 10*3/MM3 (ref 0.1–0.9)
MONOCYTES NFR BLD AUTO: 6.7 % (ref 5–12)
NEUTROPHILS NFR BLD AUTO: 70.6 % (ref 42.7–76)
NEUTROPHILS NFR BLD AUTO: 8.96 10*3/MM3 (ref 1.7–7)
NRBC BLD AUTO-RTO: 0.2 /100 WBC (ref 0–0.2)
PLATELET # BLD AUTO: 114 10*3/MM3 (ref 140–450)
PMV BLD AUTO: 12.6 FL (ref 6–12)
PROTHROMBIN TIME: 18.9 SECONDS
PROTHROMBIN TIME: 18.9 SECONDS (ref 12.8–15.2)
QT INTERVAL: 346 MS
QTC INTERVAL: 425 MS
RBC # BLD AUTO: 3.7 10*6/MM3 (ref 3.77–5.28)
TROPONIN T DELTA: -2 NG/L
TROPONIN T SERPL HS-MCNC: 83 NG/L
WBC NRBC COR # BLD: 12.7 10*3/MM3 (ref 3.4–10.8)
WHOLE BLOOD HOLD COAG: NORMAL
WHOLE BLOOD HOLD SPECIMEN: NORMAL

## 2023-01-01 PROCEDURE — 25010000002 ONDANSETRON PER 1 MG: Performed by: INTERNAL MEDICINE

## 2023-01-01 PROCEDURE — 82565 ASSAY OF CREATININE: CPT

## 2023-01-01 PROCEDURE — 85610 PROTHROMBIN TIME: CPT

## 2023-01-01 PROCEDURE — 70496 CT ANGIOGRAPHY HEAD: CPT

## 2023-01-01 PROCEDURE — 84484 ASSAY OF TROPONIN QUANT: CPT | Performed by: EMERGENCY MEDICINE

## 2023-01-01 PROCEDURE — 85730 THROMBOPLASTIN TIME PARTIAL: CPT | Performed by: EMERGENCY MEDICINE

## 2023-01-01 PROCEDURE — 93005 ELECTROCARDIOGRAM TRACING: CPT | Performed by: EMERGENCY MEDICINE

## 2023-01-01 PROCEDURE — 25010000002 MORPHINE PER 10 MG: Performed by: INTERNAL MEDICINE

## 2023-01-01 PROCEDURE — 70498 CT ANGIOGRAPHY NECK: CPT

## 2023-01-01 PROCEDURE — 25010000002 LORAZEPAM PER 2 MG: Performed by: NURSE PRACTITIONER

## 2023-01-01 PROCEDURE — 25510000001 IOPAMIDOL PER 1 ML: Performed by: EMERGENCY MEDICINE

## 2023-01-01 PROCEDURE — 25010000002 MORPHINE PER 10 MG: Performed by: NURSE PRACTITIONER

## 2023-01-01 PROCEDURE — 85025 COMPLETE CBC W/AUTO DIFF WBC: CPT | Performed by: EMERGENCY MEDICINE

## 2023-01-01 PROCEDURE — 84460 ALANINE AMINO (ALT) (SGPT): CPT | Performed by: EMERGENCY MEDICINE

## 2023-01-01 PROCEDURE — 71045 X-RAY EXAM CHEST 1 VIEW: CPT

## 2023-01-01 PROCEDURE — 70450 CT HEAD/BRAIN W/O DYE: CPT

## 2023-01-01 PROCEDURE — 84450 TRANSFERASE (AST) (SGOT): CPT | Performed by: EMERGENCY MEDICINE

## 2023-01-01 RX ORDER — ACETAMINOPHEN 650 MG/1
650 SUPPOSITORY RECTAL EVERY 4 HOURS PRN
Status: CANCELLED | OUTPATIENT
Start: 2023-01-01

## 2023-01-01 RX ORDER — GLYCOPYRROLATE 0.2 MG/ML
0.2 INJECTION INTRAMUSCULAR; INTRAVENOUS EVERY 4 HOURS PRN
Status: DISCONTINUED | OUTPATIENT
Start: 2023-01-01 | End: 2023-01-01 | Stop reason: HOSPADM

## 2023-01-01 RX ORDER — NALOXONE HCL 0.4 MG/ML
0.4 VIAL (ML) INJECTION
Status: CANCELLED | OUTPATIENT
Start: 2023-01-01

## 2023-01-01 RX ORDER — POLYETHYLENE GLYCOL 3350 17 G/17G
17 POWDER, FOR SOLUTION ORAL DAILY PRN
Status: DISCONTINUED | OUTPATIENT
Start: 2023-01-01 | End: 2023-01-01 | Stop reason: HOSPADM

## 2023-01-01 RX ORDER — SODIUM CHLORIDE 9 MG/ML
40 INJECTION, SOLUTION INTRAVENOUS AS NEEDED
Status: DISCONTINUED | OUTPATIENT
Start: 2023-01-01 | End: 2023-01-01 | Stop reason: HOSPADM

## 2023-01-01 RX ORDER — MORPHINE SULFATE 2 MG/ML
1 INJECTION, SOLUTION INTRAMUSCULAR; INTRAVENOUS EVERY 4 HOURS PRN
Status: CANCELLED | OUTPATIENT
Start: 2023-01-01 | End: 2023-06-21

## 2023-01-01 RX ORDER — POLYVINYL ALCOHOL 14 MG/ML
1 SOLUTION/ DROPS OPHTHALMIC
Status: DISCONTINUED | OUTPATIENT
Start: 2023-01-01 | End: 2023-01-01 | Stop reason: HOSPADM

## 2023-01-01 RX ORDER — MORPHINE SULFATE 2 MG/ML
2 INJECTION, SOLUTION INTRAMUSCULAR; INTRAVENOUS
Status: DISCONTINUED | OUTPATIENT
Start: 2023-01-01 | End: 2023-01-01 | Stop reason: HOSPADM

## 2023-01-01 RX ORDER — ACETAMINOPHEN 160 MG/5ML
650 SOLUTION ORAL EVERY 4 HOURS PRN
Status: DISCONTINUED | OUTPATIENT
Start: 2023-01-01 | End: 2023-01-01 | Stop reason: HOSPADM

## 2023-01-01 RX ORDER — SODIUM CHLORIDE 9 MG/ML
40 INJECTION, SOLUTION INTRAVENOUS AS NEEDED
Status: CANCELLED | OUTPATIENT
Start: 2023-01-01

## 2023-01-01 RX ORDER — CAL/D3/MAG11/ZINC/COP/MANG/BOR 600 MG-800
1 TABLET ORAL DAILY
COMMUNITY

## 2023-01-01 RX ORDER — MORPHINE SULFATE 2 MG/ML
1 INJECTION, SOLUTION INTRAMUSCULAR; INTRAVENOUS
Status: DISCONTINUED | OUTPATIENT
Start: 2023-01-01 | End: 2023-01-01

## 2023-01-01 RX ORDER — ONDANSETRON 2 MG/ML
4 INJECTION INTRAMUSCULAR; INTRAVENOUS EVERY 6 HOURS PRN
Status: DISCONTINUED | OUTPATIENT
Start: 2023-01-01 | End: 2023-01-01 | Stop reason: HOSPADM

## 2023-01-01 RX ORDER — ACETAMINOPHEN 650 MG/1
650 SUPPOSITORY RECTAL EVERY 4 HOURS PRN
Status: DISCONTINUED | OUTPATIENT
Start: 2023-01-01 | End: 2023-01-01 | Stop reason: HOSPADM

## 2023-01-01 RX ORDER — SODIUM CHLORIDE 0.9 % (FLUSH) 0.9 %
10 SYRINGE (ML) INJECTION AS NEEDED
Status: CANCELLED | OUTPATIENT
Start: 2023-01-01

## 2023-01-01 RX ORDER — LORAZEPAM 2 MG/ML
0.5 INJECTION INTRAMUSCULAR EVERY 4 HOURS PRN
Status: DISCONTINUED | OUTPATIENT
Start: 2023-01-01 | End: 2023-01-01 | Stop reason: HOSPADM

## 2023-01-01 RX ORDER — SODIUM CHLORIDE 0.9 % (FLUSH) 0.9 %
10 SYRINGE (ML) INJECTION EVERY 12 HOURS SCHEDULED
Status: DISCONTINUED | OUTPATIENT
Start: 2023-01-01 | End: 2023-01-01 | Stop reason: HOSPADM

## 2023-01-01 RX ORDER — SODIUM CHLORIDE 0.9 % (FLUSH) 0.9 %
1-10 SYRINGE (ML) INJECTION AS NEEDED
Status: DISCONTINUED | OUTPATIENT
Start: 2023-01-01 | End: 2023-01-01 | Stop reason: HOSPADM

## 2023-01-01 RX ORDER — AMOXICILLIN 250 MG
2 CAPSULE ORAL 2 TIMES DAILY
Status: DISCONTINUED | OUTPATIENT
Start: 2023-01-01 | End: 2023-01-01 | Stop reason: HOSPADM

## 2023-01-01 RX ORDER — ACETAMINOPHEN 325 MG/1
650 TABLET ORAL EVERY 4 HOURS PRN
Status: DISCONTINUED | OUTPATIENT
Start: 2023-01-01 | End: 2023-01-01 | Stop reason: HOSPADM

## 2023-01-01 RX ORDER — FUROSEMIDE 10 MG/ML
20 INJECTION INTRAMUSCULAR; INTRAVENOUS EVERY 4 HOURS PRN
Status: DISCONTINUED | OUTPATIENT
Start: 2023-01-01 | End: 2023-01-01 | Stop reason: HOSPADM

## 2023-01-01 RX ORDER — ACETAMINOPHEN 325 MG/1
650 TABLET ORAL EVERY 4 HOURS PRN
Status: CANCELLED | OUTPATIENT
Start: 2023-01-01

## 2023-01-01 RX ORDER — BISACODYL 5 MG/1
5 TABLET, DELAYED RELEASE ORAL DAILY PRN
Status: CANCELLED | OUTPATIENT
Start: 2023-01-01

## 2023-01-01 RX ORDER — MORPHINE SULFATE 2 MG/ML
1 INJECTION, SOLUTION INTRAMUSCULAR; INTRAVENOUS EVERY 4 HOURS PRN
Status: DISCONTINUED | OUTPATIENT
Start: 2023-01-01 | End: 2023-01-01 | Stop reason: HOSPADM

## 2023-01-01 RX ORDER — SODIUM CHLORIDE 0.9 % (FLUSH) 0.9 %
10 SYRINGE (ML) INJECTION EVERY 12 HOURS SCHEDULED
Status: CANCELLED | OUTPATIENT
Start: 2023-01-01

## 2023-01-01 RX ORDER — ONDANSETRON 4 MG/1
4 TABLET, FILM COATED ORAL EVERY 6 HOURS PRN
Status: DISCONTINUED | OUTPATIENT
Start: 2023-01-01 | End: 2023-01-01 | Stop reason: HOSPADM

## 2023-01-01 RX ORDER — ACETAMINOPHEN 160 MG/5ML
650 SOLUTION ORAL EVERY 4 HOURS PRN
Status: CANCELLED | OUTPATIENT
Start: 2023-01-01

## 2023-01-01 RX ORDER — SODIUM CHLORIDE 0.9 % (FLUSH) 0.9 %
10 SYRINGE (ML) INJECTION AS NEEDED
Status: DISCONTINUED | OUTPATIENT
Start: 2023-01-01 | End: 2023-01-01 | Stop reason: HOSPADM

## 2023-01-01 RX ORDER — BISACODYL 10 MG
10 SUPPOSITORY, RECTAL RECTAL DAILY PRN
Status: DISCONTINUED | OUTPATIENT
Start: 2023-01-01 | End: 2023-01-01 | Stop reason: HOSPADM

## 2023-01-01 RX ORDER — POLYETHYLENE GLYCOL 3350 17 G/17G
17 POWDER, FOR SOLUTION ORAL DAILY PRN
Status: CANCELLED | OUTPATIENT
Start: 2023-01-01

## 2023-01-01 RX ORDER — LORAZEPAM 2 MG/ML
0.5 INJECTION INTRAMUSCULAR EVERY 6 HOURS
Status: DISCONTINUED | OUTPATIENT
Start: 2023-01-01 | End: 2023-01-01 | Stop reason: HOSPADM

## 2023-01-01 RX ORDER — ONDANSETRON 4 MG/1
4 TABLET, FILM COATED ORAL EVERY 6 HOURS PRN
Status: CANCELLED | OUTPATIENT
Start: 2023-01-01

## 2023-01-01 RX ORDER — AMOXICILLIN 250 MG
2 CAPSULE ORAL 2 TIMES DAILY
Status: CANCELLED | OUTPATIENT
Start: 2023-01-01

## 2023-01-01 RX ORDER — NALOXONE HCL 0.4 MG/ML
0.4 VIAL (ML) INJECTION
Status: DISCONTINUED | OUTPATIENT
Start: 2023-01-01 | End: 2023-01-01 | Stop reason: HOSPADM

## 2023-01-01 RX ORDER — ONDANSETRON 2 MG/ML
4 INJECTION INTRAMUSCULAR; INTRAVENOUS EVERY 6 HOURS PRN
Status: CANCELLED | OUTPATIENT
Start: 2023-01-01

## 2023-01-01 RX ORDER — MORPHINE SULFATE 2 MG/ML
2 INJECTION, SOLUTION INTRAMUSCULAR; INTRAVENOUS EVERY 6 HOURS
Status: DISCONTINUED | OUTPATIENT
Start: 2023-01-01 | End: 2023-01-01 | Stop reason: HOSPADM

## 2023-01-01 RX ORDER — SODIUM CHLORIDE 0.9 % (FLUSH) 0.9 %
1-10 SYRINGE (ML) INJECTION AS NEEDED
Status: CANCELLED | OUTPATIENT
Start: 2023-01-01

## 2023-01-01 RX ORDER — SCOLOPAMINE TRANSDERMAL SYSTEM 1 MG/1
1 PATCH, EXTENDED RELEASE TRANSDERMAL
Status: DISCONTINUED | OUTPATIENT
Start: 2023-01-01 | End: 2023-01-01 | Stop reason: HOSPADM

## 2023-01-01 RX ORDER — HALOPERIDOL 5 MG/ML
1 INJECTION INTRAMUSCULAR EVERY 4 HOURS PRN
Status: DISCONTINUED | OUTPATIENT
Start: 2023-01-01 | End: 2023-01-01 | Stop reason: HOSPADM

## 2023-01-01 RX ORDER — SODIUM CHLORIDE 9 MG/ML
100 INJECTION, SOLUTION INTRAVENOUS ONCE AS NEEDED
Status: DISCONTINUED | OUTPATIENT
Start: 2023-01-01 | End: 2023-01-01 | Stop reason: HOSPADM

## 2023-01-01 RX ORDER — ATORVASTATIN CALCIUM 40 MG/1
40 TABLET, FILM COATED ORAL NIGHTLY
Status: CANCELLED | OUTPATIENT
Start: 2023-01-01

## 2023-01-01 RX ORDER — SODIUM CHLORIDE 9 MG/ML
100 INJECTION, SOLUTION INTRAVENOUS ONCE AS NEEDED
Status: CANCELLED | OUTPATIENT
Start: 2023-01-01

## 2023-01-01 RX ORDER — BISACODYL 10 MG
10 SUPPOSITORY, RECTAL RECTAL DAILY PRN
Status: CANCELLED | OUTPATIENT
Start: 2023-01-01

## 2023-01-01 RX ORDER — BISACODYL 5 MG/1
5 TABLET, DELAYED RELEASE ORAL DAILY PRN
Status: DISCONTINUED | OUTPATIENT
Start: 2023-01-01 | End: 2023-01-01 | Stop reason: HOSPADM

## 2023-01-01 RX ADMIN — Medication 10 ML: at 08:44

## 2023-01-01 RX ADMIN — MINERAL OIL: 1000 LIQUID ORAL at 12:54

## 2023-01-01 RX ADMIN — SCOPALAMINE 1 PATCH: 1 PATCH, EXTENDED RELEASE TRANSDERMAL at 10:29

## 2023-01-01 RX ADMIN — MORPHINE SULFATE 1 MG: 2 INJECTION, SOLUTION INTRAMUSCULAR; INTRAVENOUS at 20:36

## 2023-01-01 RX ADMIN — MORPHINE SULFATE 1 MG: 2 INJECTION, SOLUTION INTRAMUSCULAR; INTRAVENOUS at 03:55

## 2023-01-01 RX ADMIN — MORPHINE SULFATE 1 MG: 2 INJECTION, SOLUTION INTRAMUSCULAR; INTRAVENOUS at 12:20

## 2023-01-01 RX ADMIN — MORPHINE SULFATE 1 MG: 2 INJECTION, SOLUTION INTRAMUSCULAR; INTRAVENOUS at 16:58

## 2023-01-01 RX ADMIN — MORPHINE SULFATE 1 MG: 2 INJECTION, SOLUTION INTRAMUSCULAR; INTRAVENOUS at 06:22

## 2023-01-01 RX ADMIN — LORAZEPAM 0.5 MG: 2 INJECTION INTRAMUSCULAR; INTRAVENOUS at 09:33

## 2023-01-01 RX ADMIN — LORAZEPAM 0.5 MG: 2 INJECTION INTRAMUSCULAR; INTRAVENOUS at 12:53

## 2023-01-01 RX ADMIN — MORPHINE SULFATE 1 MG: 2 INJECTION, SOLUTION INTRAMUSCULAR; INTRAVENOUS at 06:47

## 2023-01-01 RX ADMIN — MORPHINE SULFATE 1 MG: 2 INJECTION, SOLUTION INTRAMUSCULAR; INTRAVENOUS at 10:29

## 2023-01-01 RX ADMIN — ONDANSETRON 4 MG: 2 INJECTION INTRAMUSCULAR; INTRAVENOUS at 20:36

## 2023-01-01 RX ADMIN — GLYCOPYRROLATE 0.2 MG: 0.2 INJECTION INTRAMUSCULAR; INTRAVENOUS at 11:00

## 2023-01-01 RX ADMIN — LORAZEPAM 0.5 MG: 2 INJECTION INTRAMUSCULAR; INTRAVENOUS at 04:14

## 2023-01-01 RX ADMIN — MORPHINE SULFATE 2 MG: 2 INJECTION, SOLUTION INTRAMUSCULAR; INTRAVENOUS at 12:54

## 2023-01-01 RX ADMIN — ONDANSETRON 4 MG: 2 INJECTION INTRAMUSCULAR; INTRAVENOUS at 02:27

## 2023-01-01 RX ADMIN — MORPHINE SULFATE 1 MG: 2 INJECTION, SOLUTION INTRAMUSCULAR; INTRAVENOUS at 02:27

## 2023-01-01 RX ADMIN — Medication 10 ML: at 20:37

## 2023-01-01 RX ADMIN — IOPAMIDOL 75 ML: 755 INJECTION, SOLUTION INTRAVENOUS at 14:42

## 2023-01-01 RX ADMIN — MORPHINE SULFATE 1 MG: 2 INJECTION, SOLUTION INTRAMUSCULAR; INTRAVENOUS at 09:33

## 2023-01-01 RX ADMIN — Medication 10 ML: at 20:13

## 2023-01-01 RX ADMIN — LORAZEPAM 0.5 MG: 2 INJECTION INTRAMUSCULAR; INTRAVENOUS at 20:12

## 2023-01-09 ENCOUNTER — TELEPHONE (OUTPATIENT)
Dept: NEUROSURGERY | Facility: CLINIC | Age: 88
End: 2023-01-09
Payer: MEDICARE

## 2023-01-09 DIAGNOSIS — S32.040G COMPRESSION FRACTURE OF L4 VERTEBRA WITH DELAYED HEALING, SUBSEQUENT ENCOUNTER: Primary | ICD-10-CM

## 2023-01-09 NOTE — TELEPHONE ENCOUNTER
Spoke with patient and her daughter. The pain is mostly in her back but is somewhat in her hips and upper thighs as well. She had an xray done on 12/3021 which showed her previous L4 kyphoplasty and mild retropulsion of theL4 vertebral body, but no new fracture. The patient states that her pain has been much worse over the last week since those xrays were taken.     I will order new xrays and see her in the office tomorrow for an exam. If she needs further imaging we will order it after doing a full history/exam. I can see her at 9:30am tomorrow.

## 2023-01-09 NOTE — TELEPHONE ENCOUNTER
Provider:  Annie  Surgery/Procedure:  Kyphoplasty L4  Surgery/Procedure Date:  3/29/21  Last visit:   4/16/21  Next visit: NA     Reason for call: Spoke to Denisse Mariano, patient's daughter and caregiver. She reports Ms. Mancia has been in increased pain over the last couple of weeks, pain is in her lower back and radiates to her hips and upper thighs. The radiating pain has been occurring for a few months but has worsened over the last 2-3 weeks. No weakness or spasms. She's having difficulty standing now due to pain, she is able to stand with support at a counter for about 3 minutes before the discomfort forces her to lie back down. Has only tried Tylenol at this point without any relief, have informed daughter to have Ms. Mancia try ice/heat therapy for relief until she hears back. Pain is a 8 to 9/10. She spoke to PCP and had appointment last week, had a lumbar xray completed and PCP referred her to follow up with us for further treatment.

## 2023-01-10 ENCOUNTER — OFFICE VISIT (OUTPATIENT)
Dept: NEUROSURGERY | Facility: CLINIC | Age: 88
End: 2023-01-10
Payer: MEDICARE

## 2023-01-10 ENCOUNTER — HOSPITAL ENCOUNTER (OUTPATIENT)
Dept: GENERAL RADIOLOGY | Facility: HOSPITAL | Age: 88
Discharge: HOME | End: 2023-01-10
Admitting: PHYSICIAN ASSISTANT
Payer: MEDICARE

## 2023-01-10 VITALS — WEIGHT: 158.6 LBS | BODY MASS INDEX: 27.08 KG/M2 | RESPIRATION RATE: 17 BRPM | TEMPERATURE: 97.5 F | HEIGHT: 64 IN

## 2023-01-10 DIAGNOSIS — S32.040D COMPRESSION FRACTURE OF L4 VERTEBRA WITH ROUTINE HEALING, SUBSEQUENT ENCOUNTER: Primary | ICD-10-CM

## 2023-01-10 DIAGNOSIS — S32.040G COMPRESSION FRACTURE OF L4 VERTEBRA WITH DELAYED HEALING, SUBSEQUENT ENCOUNTER: ICD-10-CM

## 2023-01-10 DIAGNOSIS — M51.36 DEGENERATIVE DISC DISEASE, LUMBAR: ICD-10-CM

## 2023-01-10 PROBLEM — M10.9 GOUT: Status: ACTIVE | Noted: 2019-12-18

## 2023-01-10 PROBLEM — I49.9 CARDIAC ARRHYTHMIA, UNSPECIFIED: Status: ACTIVE | Noted: 2020-05-13

## 2023-01-10 PROBLEM — Z96.642 PRESENCE OF LEFT ARTIFICIAL HIP JOINT: Status: ACTIVE | Noted: 2020-05-13

## 2023-01-10 PROBLEM — G31.84 MILD COGNITIVE IMPAIRMENT OF UNCERTAIN OR UNKNOWN ETIOLOGY: Status: ACTIVE | Noted: 2019-12-31

## 2023-01-10 PROBLEM — M13.88: Status: ACTIVE | Noted: 2019-12-18

## 2023-01-10 PROBLEM — F03.90 UNSPECIFIED DEMENTIA, UNSPECIFIED SEVERITY, WITHOUT BEHAVIORAL DISTURBANCE, PSYCHOTIC DISTURBANCE, MOOD DISTURBANCE, AND ANXIETY: Status: ACTIVE | Noted: 2022-02-15

## 2023-01-10 PROBLEM — E11.29 TYPE 2 DIABETES MELLITUS WITH OTHER DIABETIC KIDNEY COMPLICATION: Status: ACTIVE | Noted: 2020-05-13

## 2023-01-10 PROBLEM — N39.0 URINARY TRACT INFECTION, SITE NOT SPECIFIED: Status: ACTIVE | Noted: 2019-12-18

## 2023-01-10 PROCEDURE — 72100 X-RAY EXAM L-S SPINE 2/3 VWS: CPT

## 2023-01-10 PROCEDURE — 99213 OFFICE O/P EST LOW 20 MIN: CPT | Performed by: PHYSICIAN ASSISTANT

## 2023-01-10 RX ORDER — LOSARTAN POTASSIUM 50 MG/1
50 TABLET ORAL DAILY
COMMUNITY
Start: 2022-10-10

## 2023-01-10 RX ORDER — TRAMADOL HYDROCHLORIDE 50 MG/1
25 TABLET ORAL 2 TIMES DAILY PRN
Qty: 30 TABLET | Refills: 0 | Status: SHIPPED | OUTPATIENT
Start: 2023-01-10

## 2023-01-10 NOTE — PROGRESS NOTES
Subjective     Chief Complaint: back pain and bilateral hip pain R>L    Patient ID: Justine Mancia is a 89 y.o. female is here today for follow-up.    History of Present Illness  Ms. Mancia is an 88-year-old female with a known history of osteoporosis.  She is known to Dr. Pyle's service for undergoing decompressive laminectomies from L3-5 on 5/23/2013.  Her history is also significant for a lumbar fusion by Dr. Corley in the early 1970s.  In March of 2021,  she presented to our service with severe low back pain.  Preoperative studies revealed an L4 compression fracture and she underwent an L4 kyphoplasty on 3/29/2021.    She had good pain relief after her surgery.  Due to increasing memory issues, she has since moved in with her daughter but remains relatively independent of self-care ADLs.  Over the last several months, she has began having pain when standing working in the kitchen or walking longer distances.  Pain is mostly in her lower back but will sometimes radiate into her hips and occasionally anterior thighs.  Pain is somewhat worse on the right than the left.  She has had 1 fall but does not have weakness or tripping.  She does not use a walker or other support.  She has no new bowel or bladder dysfunction.  Lumbar x-rays were done on 12/30/2022 which did not show a new compression fracture.  Patient gets relief from her pain if she sits or lies down.  She has been taking Tylenol occasionally she has taken tramadol in the past without becoming overly sedated    Her daughter called the office yesterday with concern that her mother's pain had become much worse up to 8-9/10 several times a week, worse with walking and standing, relieved with sitting.   She is here today with another x-ray and for a physical exam    The following portions of the patient's history were reviewed and updated as appropriate: allergies, current medications, past family history, past medical history, past social history, past  surgical history and problem list.    Past Medical History:   Diagnosis Date   • Arthritis    • Atrial fibrillation (HCC)    • Dementia (HCC)    • Diabetes mellitus (HCC)     dx: approx 3 yrs ago, checks bs twice weekly    • Full dentures     will remove before surgery   • Gout    • History of pulmonary embolus (PE)     post hip replacement   • Hx of bladder infections    • Hyperlipidemia    • Hypertension    • Low back pain    • Osteoporosis    • UTI (urinary tract infection)    • Wears glasses      Past Surgical History:   Procedure Laterality Date   • APPENDECTOMY     • CATARACT EXTRACTION Bilateral    • CERVICAL FUSION  1980's   • KYPHOPLASTY N/A 3/29/2021    Procedure: KYPHOPLASTY L4;  Surgeon: Mars Pyle MD;  Location: [facility];  Service: Neurosurgery;  Laterality: N/A;   • LAPAROSCOPIC TUBAL LIGATION     • LUMBAR FUSION  1970's    Dr. Corley   • LUMBAR LAMINECTOMY  2013    L3/4 and L4/5; Dr. Mars Pyle   • OTHER SURGICAL HISTORY Right     bone spur removal - foot       Family history:   Family History   Problem Relation Age of Onset   • Arthritis Mother    • Diabetes Mother    • Hypertension Mother    • Heart disease Father    • Hypertension Father    • Heart disease Brother    • Tuberculosis Brother        Social history:   Social History     Socioeconomic History   • Marital status:    Tobacco Use   • Smoking status: Never   • Smokeless tobacco: Never   Vaping Use   • Vaping Use: Never used   Substance and Sexual Activity   • Alcohol use: Never   • Drug use: Never   • Sexual activity: Defer       Review of Systems   Constitutional: Negative for activity change, appetite change, chills, diaphoresis, fatigue, fever and unexpected weight change.   HENT: Negative for congestion, dental problem, drooling, ear discharge, ear pain, facial swelling, hearing loss, mouth sores, nosebleeds, postnasal drip, rhinorrhea, sinus pressure, sneezing, sore throat, tinnitus, trouble swallowing and voice change.  "   Eyes: Negative for photophobia, pain, discharge, redness, itching and visual disturbance.   Respiratory: Negative for apnea, cough, choking, chest tightness, shortness of breath, wheezing and stridor.    Cardiovascular: Negative for chest pain, palpitations and leg swelling.   Gastrointestinal: Negative for abdominal distention, abdominal pain, anal bleeding, blood in stool, constipation, diarrhea, nausea, rectal pain and vomiting.   Endocrine: Negative for cold intolerance, heat intolerance, polydipsia, polyphagia and polyuria.   Genitourinary: Negative for decreased urine volume, difficulty urinating, dysuria, enuresis, flank pain, frequency, genital sores, hematuria and urgency.   Musculoskeletal: Positive for arthralgias and back pain. Negative for gait problem, joint swelling, myalgias, neck pain and neck stiffness.   Skin: Negative for color change, pallor, rash and wound.   Allergic/Immunologic: Negative for environmental allergies, food allergies and immunocompromised state.   Neurological: Negative for dizziness, tremors, seizures, syncope, facial asymmetry, speech difficulty, weakness, light-headedness, numbness and headaches.   Hematological: Negative for adenopathy. Does not bruise/bleed easily.   Psychiatric/Behavioral: Negative for agitation, behavioral problems, confusion, decreased concentration, dysphoric mood, hallucinations, self-injury, sleep disturbance and suicidal ideas. The patient is not nervous/anxious and is not hyperactive.    All other systems reviewed and are negative.      Objective   Temperature 97.5 °F (36.4 °C), temperature source Infrared, resp. rate 17, height 162.6 cm (64\"), weight 71.9 kg (158 lb 9.6 oz).  Body mass index is 27.22 kg/m².    Physical Exam  CONSTITUTIONAL:   - Patient is well-nourished  - Pleasant and appears stated age.  PSYCHIATRIC:  - Normal mood and affect. Pleasant, cooperative and speech is clear,, but she is unable to answer questions of personal history "   - Behavior is normal.  HENT:   Head: Normocephalic and Atraumatic.   Eyes:     - Pupils are equal, round, and reactive to light.     - EOM are normal.   CV:   - Regular rate and rhythm on palpable radial pulse   PULMONARY:   - Speaking in full sentences, breathing non labored  - No wheezing   SKIN:  - Clean, dry and intact   -there is a deep depression at the level of her previous lumbar surgery; the skin is clean, dry, and intact.  MUSCULOSKELETAL:  - Low back is tender to palpation midline at the iliac crest. .   - Strength is 4/5 and intact in the upper and lower extremities to direct testing.  - Muscle tone is generally reduced  - gait is slow and halting, but she is able to stand and walk across the room independently .  - ROM in neck/back is normal.  NEUROLOGICAL:  - A&Ox3  - Attention span and, language function,are intact. Memory is impaired  - Sensation is intact to light touch testing throughout.  - Deep tendon reflexes are 1+ and symmetrical.      Assessment & Plan     Independent Review of Radiographic Studies:    Xrays of the lumbar spine taken today were reviewed. These show postop surgical changes from the kyphoplasty and from the laminectomy  There is stable retropulsion of the L4 compression fracture. There does not appear to be a new compression fracture.     Medical Decision Making:    We had a discussion with the patient and with her daughter about treatment goals. Ms Mancia does not appear to have a new compression fracture; her  symptoms are possibly due to stenosis with neurogenic claudication which cannot be fully assessed by an xray. She states that she does not feel so limited by her pain at this point that she would want to have surgery should that be the case.  We discussed obtaining an MRI with and without contrast, but ultimately decided to postpone this for now.  We will treat her symptomatically with half tablet of tramadol for severe pain flareups and refer her to pain management  for injections.   Should her pain worsen and become more limiting, we will obtain an MRI and see her back in the office    Diagnoses and all orders for this visit:    1. Compression fracture of L4 vertebra with routine healing, subsequent encounter (Primary)  -     Ambulatory Referral to Pain Management    2. Degenerative disc disease, lumbar  -     Ambulatory Referral to Pain Management    Tramadol, 50mg 1/2 tablet BID prn for moderate to severe pain #30 with no refill.     Return if symptoms worsen or fail to improve.    LONG BrodyC

## 2023-02-02 NOTE — PROGRESS NOTES
"Chief Complaint: \"Lower back and hip pain.\"         History of Present Illness:   Patient: Ms. Justine Mancia, 89 y.o. female   Referring Physician: Ciarra Tavares PA-C  Reason for Referral: Consultation for chronic intractable lower back pain.   Pain History: Patient reports a longstanding history of lower back pain.  She underwent a lumbar fusion in the 1970s by Dr. Corley.  In 2013, she underwent lumbar laminectomy L3-L4 and L4-L5 with Dr. Pyle, and more recently L4 kyphoplasty by Dr. Pyle.  Patient has a known history of severe osteoporosis.  In March 2021, she presented with severe lower back pain.  Preoperative studies revealed L4 compression fracture, for which she underwent L4 kyphoplasty on 3/29/2021.  Patient reports that she experienced significant relief after surgery.  Due to memory issues, patient moved with her daughter.  Her daughter provides most of the history.  Over the last several months, she started to experience lower back pain associated with severe neurogenic medication.  Pain is mostly localized in her lower back and sometimes radiates into her hips and anterior aspect of thighs.  Pain increases with standing or walking.  Pain decreases with sitting or lying down.  Lumbar x-rays on 1/11/2023 revealed diffuse demineralization of the bones.  Alignment is maintained.  Decrease of height and kyphoplasty at L4. Severe degenerative disc disease and facet joint disease throughout the lumbar spine. Evidence of prior posterior lumbar decompression.  MRI of the lumbar spine on 3/16/2021, prior to kyphoplasty, revealed evidence of lumbar decompression from L2-L3 through L5-S1. Severe sarcopenia of the paravertebral muscles. Acute superior endplate compression fracture at L4 with 40% loss of vertebral body height.  There was also evidence of severe foraminal stenosis at the L2-L3, severe lateral recess and foraminal stenosis at the L3-L4, moderate bilateral foraminal stenosis at L4-5 and L5-S1. " Justine Mancia underwent neurosurgical consultation with Ciarra Tavares PA-C on 01/10/2023, and was found not to be a surgical candidate.  Patient was offered to proceed with a new MRI with and without contrast but patient declined.  She has been treated symptomatically with tramadol for flareups of her pain and has been referred in consultation for interventional pain management measures. Patient has failed to obtain pain relief with conservative measures for more than 12 months including oral analgesics, opioids, topical analgesics, physical therapy (last visit within the past 6 months), physical therapist directed home exercise program HEP (ongoing), to name a few. Pain has progressed in intensity over the past several year.  Pain Description: Constant lower back pain with intermittent exacerbation, described as aching, burning, dull, sharp and throbbing sensation.   Radiation of Pain: The pain radiates into the hips  Pain intensity today: 5/10   Average pain intensity last week: 4/10  Pain intensity ranges from:5/10 to 8/10  Aggravating factors: Pain increases with bending, twisting, standing, walking. Patient describes neurogenic claudication.   Alleviating factors: Pain decreases with rest, sitting down, lying down  Associated Symptoms:   Patient denies numbness but reports overall weakness in the lower extremities.   Patient denies any new bladder or bowel problems.   Patient reports difficulties with her balance but denies recent falls.   Pain interferes with ADLs, general activities (ability to walk, stand, transition from different positions), and affects patient's quality of life  Pain does not interfere with sleep causing sleep fragmentation   Stiffness: Lower back    Review of previous therapies and additional medical records:  Justine Mancia has already failed the following measures, including:   Conservative Measures: Oral analgesics, opioids, topical analgesics, physical therapy (last visit within  the past 6 months), physical therapist directed home exercise program HEP (ongoing)  Interventional Measures: None  Surgical Measures:   1970s: Lumbar fusion by Dr. Corley.    1980s: Cervical fusion  2013: Lumbar laminectomy L3-L4 and L4-L5 by Dr. Pyle  2016: Hx left THR   03/29/2021: L4 kyphoplasty by Dr. Pyle.   Justine Mancia underwent neurosurgical consultation with Ciarra Tavares PA-C on 01/10/2023, and was found not to be a surgical candidate.  Justine Mancia presents with significant comorbidities including atrial fibrillation, type 2 diabetes mellitus, gout, history of pulmonary embolus, hyperlipidemia, hypertension, osteoporosis, Hx compression fracture of fourth lumbar vertebra, mild cognitive impairment, anxiety  In terms of current analgesics, Justine Mancia takes: Tramadol.  Patient also takes allopurinol, memantine  I have reviewed Miki Report consistent with medication reconciliation.  SOAPP: Low Risk     PHQ-2 Depression Screening  Little interest or pleasure in doing things? 0-->not at all   Feeling down, depressed, or hopeless? 0-->not at all   PHQ-2 Total Score 0     Global Pain Scale 02-07 2023          Pain 14          Feelings 11          Clinical outcomes 12          Activities 18          GPS Total: 55            The [place] Back Pain Disability Scale   DATE 02-07 2023          Sleep through the night 2          Turn over in bed 3          Get out of bed 3          Make your bed 2          Put on socks (pantyhose) 4          Ride in a car 3          Sit in a chair for several hours 3          Stand up for 20-30 minutes 5          Climb one flight of stairs 5          Walk a few blocks (200-300 yards)  4          Walk several miles 5          Run one block (about 50 yards) 5          Take food out of the refrigerator 2          Reach up to high shelves 5          Move a chair 4          Pull or push heavy doors 4          Bend over to clean the bathtub 5          Throw a ball 2           Carry two bags of groceries 5          Lift and carry a heavy suitcase 5          Total score 76            Review of Diagnostic Studies:  I have reviewed and interpreted the images with the patient and used the images and a tridimensional spine model to explain findings. I have reviewed the report, as well.  Lumbar spine x-rays on 1/11/2023 revealed diffuse demineralization of the bones.  Alignment is maintained.  Decrease of height and kyphoplasty at L4.  Severe degenerative disc disease and facet joint disease throughout the lumbar spine.  Evidence of prior posterior lumbar decompression.  MRI of the lumbar spine without contrast on March 16, 2021 revealed evidence of lumbar decompression from L2-L3 through L5-S1.  Severe sarcopenia of the paravertebral muscles.  Acute superior endplate compression fracture at L4 with 40% loss of vertebral body height.  Axial imaging:  L1-L2: Disc bulge, facet hypertrophy.  Mild canal and mild foraminal stenosis  L2-L3: Large disc bulge with foraminal extension, facet arthrosis.  Evidence of decompression laminectomy with improved patency of the canal.  Mild residual canal stenosis.  Severe disc space narrowing and severe bilateral foraminal stenosis   L3-L4: Large diffuse bulge extending into the right paracentral and foraminal region.  Facet hypertrophy.  Severe left lateral recess stenosis obliterating the passing L4 nerve root.  Severe bilateral neuroforaminal stenosis.  No significant canal stenosis  L4-L5: Prior laminectomy.  Canal is patent.  Facet hypertrophy.  Mild bilateral foraminal stenosis  L5-S1: Vertebral bodies are almost fused.  Evidence of decompression laminectomy.  Facet hypertrophy.  Central canal is patent.  Moderate bilateral foraminal stenosis    Review of Systems   Constitutional: Positive for appetite change and fatigue.   HENT: Positive for postnasal drip.    Respiratory: Positive for shortness of breath.    Musculoskeletal: Positive for arthralgias,  back pain and joint swelling.   Neurological: Positive for weakness.   Hematological: Bruises/bleeds easily.   Psychiatric/Behavioral: Positive for confusion and decreased concentration.   All other systems reviewed and are negative.        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)   • Unspecified dementia, unspecified severity, without behavioral disturbance, psychotic disturbance, mood disturbance, and anxiety (HCC)   • Urinary tract infection, site not specified   • Degeneration of lumbar or lumbosacral intervertebral disc   • Lumbar stenosis with neurogenic claudication   • Lumbar postlaminectomy syndrome   • Spondylosis of lumbar region without myelopathy or radiculopathy   • History of kyphoplasty   • History of lumbar surgery   • Gait disturbance   • Physical deconditioning       Past Medical History:   Diagnosis Date   • Arthritis    • Atrial fibrillation (HCC)    • Dementia (HCC)    • Diabetes mellitus (HCC)     dx: approx 3 yrs ago, checks bs twice weekly    • Full dentures     will remove before surgery   • Gout    • History of pulmonary embolus (PE)     post hip replacement   • Hx of bladder infections    • Hyperlipidemia    • Hypertension    • Low back pain    • Osteoporosis    • UTI (urinary tract infection)    • Wears glasses          Past Surgical History:   Procedure Laterality Date   • APPENDECTOMY     • CATARACT EXTRACTION Bilateral    • CERVICAL FUSION  1980's   • KYPHOPLASTY N/A 3/29/2021    Procedure: KYPHOPLASTY L4;  Surgeon: Mars Pyle MD;  Location: [facility];  Service: Neurosurgery;  Laterality: N/A;   • LAPAROSCOPIC TUBAL LIGATION     • LUMBAR FUSION  1970's      [facility]   • LUMBAR LAMINECTOMY  2013    L3/4 and L4/5; Dr. Mars Pyle   • OTHER SURGICAL HISTORY Right     bone spur removal - foot         Family History   Problem Relation Age of Onset   • Arthritis Mother    • Diabetes Mother    • Hypertension Mother    • Heart disease Father    • Hypertension Father    • Heart disease Brother    • Tuberculosis Brother          Social History     Socioeconomic History   • Marital status:    Tobacco Use   • Smoking status: Never   • Smokeless tobacco: Never   Vaping Use   • Vaping Use: Never used   Substance and Sexual Activity   • Alcohol use: Never   • Drug use: Never   • Sexual activity: Defer           Current Outpatient Medications:   •  allopurinol (ZYLOPRIM) 100 MG tablet, Take 100 mg by mouth 2 (Two) Times a Day., Disp: , Rfl:   •  atorvastatin (LIPITOR) 40 MG tablet, Take 40 mg by mouth Every Night., Disp: , Rfl:   •  Calcium Carbonate-Vitamin D (CALTRATE 600+D PO), Take 1 tablet by mouth Daily. 800 IU VIT D3, Disp: , Rfl:   •  Eliquis 5 MG tablet tablet, , Disp: , Rfl:   •  glipizide (GLUCOTROL) 5 MG tablet, Take 5 mg by mouth Daily., Disp: , Rfl:   •  losartan (COZAAR) 50 MG tablet, Take 50 mg by mouth Daily., Disp: , Rfl:   •  memantine (NAMENDA) 10 MG tablet, Take 10 mg by mouth Daily., Disp: , Rfl:   •  metFORMIN (GLUCOPHAGE) 500 MG tablet, Take 500 mg by mouth 2 (Two) Times a Day With Meals., Disp: , Rfl:   •  metoprolol succinate XL (TOPROL-XL) 50 MG 24 hr tablet, Take 25 mg by mouth 2 (two) times a day. Cut in half, Disp: , Rfl:   •  nitrofurantoin (MACRODANTIN) 100 MG capsule, Take 100 mg by mouth Daily. Maintenance for frequent uti, Disp: , Rfl:   •  potassium chloride (MICRO-K) 10 MEQ CR capsule, Take 10 mEq by mouth Daily., Disp: , Rfl:   •  predniSONE (DELTASONE) 10 MG tablet, Take 10 mg by mouth Daily., Disp: , Rfl:   •  traMADol (ULTRAM) 50 MG tablet, Take 0.5 tablets by mouth 2 (Two) Times a Day As Needed for Moderate Pain or Severe Pain., Disp: 30  "tablet, Rfl: 0      Allergies   Allergen Reactions   • Aspirin GI Intolerance         /80   Pulse 84   Temp 95.5 °F (35.3 °C)   Ht 162.6 cm (64\")   Wt 72 kg (158 lb 12.8 oz)   SpO2 99%   BMI 27.26 kg/m²       Physical Exam:  Constitutional: Patient appears well-developed, well-nourished, well-hydrated, appears younger than stated age  HEENT: Head: Normocephalic and atraumatic  Eyes: Conjunctivae and lids are normal  Pupils: Equal, round, reactive to light  Peripheral vascular exam: Femoral: right 2+, left 2+. Posterior tibialis: right 2+ and left 2+. Dorsalis pedis: right 2+ and left 2+. 1+ edema. Presence of varicose veins.  Musculoskeletal   Gait and station: Gait evaluation demonstrated shuffling   Lumbar spine: Passive and active range of motion are limited secondary to pain. Extension, lateral flexion, rotation of the lumbar spine increased and reproduced pain. Lumbar facet joint loading maneuvers are positive.  Luis F test and Gaenslen's test are negative   Piriformis maneuvers are negative   Hip joints: The range of motion of the hip joints is limited to flexion and internal rotation bilaterally but without pain.   Palpation of the bilateral greater trochanter, unrevealing   Examination of the Iliotibial band: unrevealing   Neurological:   Patient is alert and oriented to person, place, and time.   Speech: Normal.   Cortical function: Normal mental status.   Reflex Scores:  Right patellar: 0+  Left patellar: 0+  Right Achilles: 0+  Left Achilles: 0+  Motor strength: 5/5  Motor Tone: Normal  Involuntary movements: None.   Superficial/Primitive Reflexes: Primitive reflexes were absent.   Right Caballero: Absent  Left Caballero: Absent  Right ankle clonus: Absent  Left ankle clonus: Absent   Babinsky: Absent  Long tract signs: Negative. Straight leg raising test: Negative. Femoral stretch sign: unable to test due to patient's condition  Sensory exam: Intact to light touch, intact pain and temperature " sensation, intact vibration sensation and normal proprioception.   Skin and subcutaneous tissue: Skin is warm and intact. No rash noted. No cyanosis.   Psychiatric: Judgment and insight: Normal. Recent and remote memory: Intact. Mood and affect: Normal.     ASSESSMENT:   1. Spondylosis of lumbar region without myelopathy or radiculopathy    2. Lumbar stenosis with neurogenic claudication    3. Lumbar postlaminectomy syndrome    4. Degeneration of lumbar or lumbosacral intervertebral disc    5. History of kyphoplasty    6. History of lumbar surgery    7. Mild cognitive impairment of uncertain or unknown etiology    8. 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 Gait disturbance    10. Physical deconditioning        PLAN/MEDICAL DECISION MAKING:  Ms. Justine Mancia, 89 y.o. female presents with a longstanding history of chronic progressive mechanical lower back pain. She underwent a lumbar fusion in the 1970s by Dr. Corley.  In 2013, she underwent lumbar laminectomy L3-L4 and L4-L5 with Dr. Pyle, and more recently L4 kyphoplasty by Dr. Pyle.  Patient has a known history of severe osteoporosis.  In March 2021, she presented with severe lower back pain.  Preoperative studies revealed L4 compression fracture, for which she underwent L4 kyphoplasty on 3/29/2021.  Patient reports that she experienced significant relief after surgery. Due to memory issues, patient moved with her daughter. Over the last several months, she started experiencing more significant axial mechanical lower back pain associated with intolerance to standing and walking. Pain is mostly localized in her lower back and sometimes radiates into her hips.  It is unclear if there is distal radiation into her thighs or lower legs. Pain increases with standing or walking although she also experiences mechanical lower back pain mostly with bending, twisting, or extension of her lumbar spine.  Pain decreases with sitting or  lying down.  Lumbar x-rays on 1/11/2023 revealed diffuse demineralization of the bones.  Alignment is maintained.  Decrease of height and kyphoplasty at L4. Severe degenerative disc disease and facet joint disease throughout the lumbar spine. Evidence of posterior lumbar decompression.  MRI of the lumbar spine on 3/16/2021, prior to kyphoplasty, revealed evidence of lumbar decompression from L2-L3 through L5-S1. Severe sarcopenia of the paravertebral muscles. Acute superior endplate compression fracture at L4 with 40% loss of vertebral body height.  Facet hypertrophy with increased fluid signal.  There was also evidence of severe foraminal stenosis at the L2-L3, severe lateral recess and foraminal stenosis at the L3-L4, moderate bilateral foraminal stenosis at L4-5 and L5-S1. Justine Mancia underwent neurosurgical consultation with Ciarra Tavares PA-C on 01/10/2023, and was found not to be a surgical candidate. Patient was offered to proceed with a new MRI with and without contrast but patient declined at that time.  She has been treated symptomatically with tramadol for flareups of her pain and has been referred in consultation for interventional pain management measures. Patient has failed to obtain pain relief with conservative measures for more than 12 months including oral analgesics, opioids, topical analgesics, physical therapy (last visit within the past 6 months), physical therapist directed home exercise program HEP (ongoing), to name a few. Pain has progressed in intensity over the past several year.  Pain appears to be multifactorial and she expresses a great deal of mechanical lower back pain.  She reports significant intolerance to standing and walking better.  MRI also revealed severe sarcopenia of the paravertebral muscles.  Overall, she presents with significant deconditioning and a gait disturbance.  A comprehensive evaluation including history and physical exam along with pertinent physiologic and  functional assessment was performed. Patient presents with intractable pain due to the diagnoses listed above. Patient has failed to respond to conservative modalities, as referenced under HPI including the impact of patient's moderate-to-severe pain contributing to significant impairment in daily activities, ADLs, and a negative impact on quality of life. Supporting diagnostic studies of patient's chronic pain condition have been reviewed. I have reviewed all available patient's medical records as well as previous therapies as referenced above. I had a lengthy conversation with Ms. Justine Mancia regarding her chronic pain condition and potential therapeutic options including risks, benefits, alternative therapies, to name a few. We have discussed using a stepwise approach starting with the shortest or least intense level of treatment, care, or service as determined by the extent required to diagnose and or treat patient's condition. The treatments proposed are consistent with the patient's medical condition and are known to be as safe and effective by current guidelines and standard of care. There is no evidence of absolute contraindications for the proposed procedures under the current circumstances. These treatments are not considered experimental or investigational. The duration and frequency proposed are considered appropriate for the service in accordance with accepted standards of medical practice for the diagnosis and or treatment of the patient's condition and or intended to improve the patient's level of function. These services will be furnished in a setting appropriate to the patient's medical needs and condition. Therefore, I have proposed the following plan:  1. Interventional pain management measures: Patient presents with chronic unrelenting moderate to severe axial mechanical lumbar spine facetogenic pain without evidence of underlying or untreated radiculopathy and that causes functional deficit  measured on pain scales and or functional and disability scales. Pain has been present for 12 months. Justine Mancia average pain level for the past months has been rated as 5/10 ranging from 4/10 to 8/10. Patient has failed to obtain pain relief or functional improvement from conservative measures, as referenced under HPI. Pain assessment has been performed and documented at baseline, and will be reassessed after each diagnostic procedure using the same pain scale for each assessment. A disability scale has also been obtained at baseline and will be used for functional assessment.  Diagnostic interventions will be performed without sedation or with the use of light sedation (but without the use of opioids) depending on patient's specific medical requirements. For radiofrequency procedures; we may proceed without sedation or with various degrees of sedation according to patient's specific requirements. All bilateral procedures will be done in one encounter. Patient will be scheduled for a first set of diagnostic bilateral lumbar medial branch blocks at L2, L3, L4; for bilateral lumbar facet joints at L3-L4, and L4-L5, to clarify the origin of chronic refractory mechanical lower back pain. If patient experiences 80% or more relief along with significant functional improvement and increase in the range of motion of the lumbar spine, then, patient will be scheduled for a second set of diagnostic bilateral lumbar medial branch blocks, to then, proceed with bilateral lumbar medial branch rhizotomies. If patient experiences 50% or more pain relief and functional improvement for at least six months, we could repeat the procedure. If more than 2 years elapse since last RFTC, then, patient will be required to undergo diagnostic blocks prior to repeating RFTC.  I have also discussed with the patient the possibility of diagnostic and therapeutic bilateral lumbar transforaminal epidural steroid injections using the lowest  effective dose of steroids, under C-arm fluoroscopic guidance, with the use of contrast dye (unless contraindicated) to confirm appropriate needle placement and spread of contrast dye. We may repeat therapeutic bilateral lumbar transforaminal epidural steroid injections depending on patient's outcome.  As per current guidelines, epidurals will be limited to a maximum of 4 sessions per spinal region in a rolling twelve (12) month period. Continuation of epidural steroid injections over 12 months would only be considered under the following provisions;  • Patient is a high-risk surgical candidate, or the patient does not desire surgery, or recurrence of pain in the same location relieved with ESIs for at least three months and epidural provides at least 50% sustained improvement of pain and/or 50% objective improvement in function (using same scale as baseline)  • Pain is severe enough to cause a significant degree of functional disability or vocational disability  • The primary care provider will be notified regarding continuation of procedures and repeat steroid use   Patient has been found to be a surgical candidate.  Therefore, I have a lengthy conversation with the patient and her daughter about neuromodulation techniques, specifically a spinal cord stimulator trial with [employer].  Patient has received formal education from me including risks, benefits, and alternative treatments, as well as detailed information regarding the procedure and specific goals for the trial. Patient has also received didactic materials including educational booklets and DVDs on spinal cord stimulation therapies.  2. Diagnostic studies:  A. MRI of the lumbar spine without contrast due to progression of symptoms.  I will not order her MRI with and without contrast as patient is not a surgical candidate, the risk of contrast administration, and the added length of a contrast study   B. MRI of the thoracic spine without contrast to  assess capacity and patency of the spinal canal and epidural space prior to spinal cord stimulator trial and implant  C. Flexion and extension X-rays of the lumbar spine to assess lumbar stability  D. CBC, PT, PTT prior SCS trial  3. Pharmacological measures: Reviewed and discussed;   A. Patient takes Tramadol.  Patient also takes allopurinol, memantine   B. Trial with prilocaine 2%, lidocaine 10%, cyclobenzaprine 4%, capsaicin 0.001% and mannitol 20% cream, apply 1 to 2 grams of cream to the affected areas every 4 to 6 hours as needed  4. Long-term rehabilitation efforts:  A. The patient does not have a history of falls but has risk factors for falls. I did complete a risk assessment for falls. Fall precautions: Patient has been instructed regarding universal fall precautions, such as;   • Using gait aids a cane or walker at the appropriate height at all times for ambulation or a wheelchair  • Removing all area rugs and coffee tables to create a safe environment at home  • Ensure clean, dry floors  • Wearing supportive footwear and properly fitting clothing  • Ensure bed/chair is appropriate height and patient's feet can touch the floor  • Using a shower transfer bench  • Using walk-in shower and having shower safety bars installed  • Ensure proper lighting, minimize glare  • Have nightlights operational and in use  • Participation in an exercise program for gait training, balance training and strength  • Avoid carrying laundry up and down steps  • Ensure proper compliance and organization of medications to avoid errors   • Avoid use of over the counter sedatives and alcohol consumption  • Ensure easy access to call bell, glasses, TV control, telephone  • Ensure glasses/hearing aids are in use or close by (on top of night table)  B. Patient will start a comprehensive physical therapy program for water therapy, Alter-G, core strengthening, gait and balance training, neurodynamics, E-STIM, myofascial release,  cupping, dry needling, home exercise program,once pain is under control  C. Start an exercise program such as getting up and walking around the house with assistance.  She again may be a candidate for water therapy patient will consider this option  D. Contrast therapy: Apply ice-packs for 15-20 minutes, followed by heating pads for 15-20 minutes to affected area   E. Referral to Dr. Shashank Vargas for psychological screening for spinal cord stimulation and intrathecal therapies.  F. Justine Mancia  reports that she has never smoked. She has never used smokeless tobacco..   5. The patient and her family have been instructed to contact my office with any questions or difficulties. The patient understands the plan and agrees to proceed accordingly.    The patient has a documented plan of care to address chronic pain. Justine Mancia reports a pain score of 5/10.  Given her pain assessment as noted, treatment options were discussed and the following options were decided upon as a follow-up plan to address the patient's pain: continuation of current treatment plan for pain, educational materials on pain management, home exercises and therapy, prescription for non-opiod analgesics, referral to Physical Therapy, referral to specialist for assistance in pain treatment guidance, steroid injections, use of non-medical modalities (ice, heat, stretching and/or behavior modifications) and Interventional pain management issues including neuromodulation techniques.              Pain Management Panel    There is no flowsheet data to display.          YAZMIN query complete. YAZMIN reviewed by William Peguero MD.     Pain Medications             predniSONE (DELTASONE) 10 MG tablet Take 10 mg by mouth Daily.    traMADol (ULTRAM) 50 MG tablet Take 0.5 tablets by mouth 2 (Two) Times a Day As Needed for Moderate Pain or Severe Pain.         Please note that portions of this note were completed with a voice recognition program.      William Peguero MD    Patient Care Team:  Dino Godinez PA-C as PCP - General (Physician Assistant)  William Peguero MD as Consulting Physician (Pain Medicine)     No orders of the defined types were placed in this encounter.        No future appointments.

## 2023-02-06 PROBLEM — M47.816 SPONDYLOSIS OF LUMBAR REGION WITHOUT MYELOPATHY OR RADICULOPATHY: Status: ACTIVE | Noted: 2023-02-06

## 2023-02-06 PROBLEM — M51.37 DEGENERATION OF LUMBAR OR LUMBOSACRAL INTERVERTEBRAL DISC: Status: ACTIVE | Noted: 2023-01-01

## 2023-02-06 PROBLEM — M48.062 LUMBAR STENOSIS WITH NEUROGENIC CLAUDICATION: Status: ACTIVE | Noted: 2023-02-06

## 2023-02-06 PROBLEM — Z98.890 HISTORY OF LUMBAR SURGERY: Status: ACTIVE | Noted: 2023-01-01

## 2023-02-06 PROBLEM — Z98.890 HISTORY OF KYPHOPLASTY: Status: ACTIVE | Noted: 2023-02-06

## 2023-02-06 PROBLEM — M96.1 LUMBAR POSTLAMINECTOMY SYNDROME: Status: ACTIVE | Noted: 2023-02-06

## 2023-02-07 ENCOUNTER — OFFICE VISIT (OUTPATIENT)
Dept: PAIN MEDICINE | Facility: CLINIC | Age: 88
End: 2023-02-07
Payer: MEDICARE

## 2023-02-07 VITALS
WEIGHT: 158.8 LBS | OXYGEN SATURATION: 99 % | HEIGHT: 64 IN | BODY MASS INDEX: 27.11 KG/M2 | TEMPERATURE: 95.5 F | SYSTOLIC BLOOD PRESSURE: 176 MMHG | HEART RATE: 84 BPM | DIASTOLIC BLOOD PRESSURE: 80 MMHG

## 2023-02-07 DIAGNOSIS — M48.062 LUMBAR STENOSIS WITH NEUROGENIC CLAUDICATION: ICD-10-CM

## 2023-02-07 DIAGNOSIS — Z01.818 PREOPERATIVE EXAMINATION: ICD-10-CM

## 2023-02-07 DIAGNOSIS — M47.816 SPONDYLOSIS OF LUMBAR REGION WITHOUT MYELOPATHY OR RADICULOPATHY: ICD-10-CM

## 2023-02-07 DIAGNOSIS — Z98.890 HISTORY OF KYPHOPLASTY: ICD-10-CM

## 2023-02-07 DIAGNOSIS — M96.1 LUMBAR POSTLAMINECTOMY SYNDROME: ICD-10-CM

## 2023-02-07 DIAGNOSIS — R26.9 GAIT DISTURBANCE: ICD-10-CM

## 2023-02-07 DIAGNOSIS — M51.37 DEGENERATION OF LUMBAR OR LUMBOSACRAL INTERVERTEBRAL DISC: ICD-10-CM

## 2023-02-07 DIAGNOSIS — Z98.890 HISTORY OF LUMBAR SURGERY: ICD-10-CM

## 2023-02-07 DIAGNOSIS — G31.84 MILD COGNITIVE IMPAIRMENT OF UNCERTAIN OR UNKNOWN ETIOLOGY: ICD-10-CM

## 2023-02-07 DIAGNOSIS — R53.81 PHYSICAL DECONDITIONING: ICD-10-CM

## 2023-02-07 DIAGNOSIS — Z00.8 PRE-SURGICAL PSYCHOLOGICAL ASSESSMENT, ENCOUNTER FOR: ICD-10-CM

## 2023-02-07 DIAGNOSIS — E11.29 TYPE 2 DIABETES MELLITUS WITH OTHER DIABETIC KIDNEY COMPLICATION: ICD-10-CM

## 2023-02-07 PROCEDURE — 99204 OFFICE O/P NEW MOD 45 MIN: CPT | Performed by: ANESTHESIOLOGY

## 2023-02-07 RX ORDER — APIXABAN 5 MG/1
TABLET, FILM COATED ORAL
COMMUNITY
Start: 2023-02-03

## 2023-02-07 RX ORDER — ATORVASTATIN CALCIUM 40 MG/1
TABLET, FILM COATED ORAL EVERY 24 HOURS
COMMUNITY
End: 2023-02-07 | Stop reason: SDUPTHER

## 2023-02-12 ENCOUNTER — PATIENT ROUNDING (BHMG ONLY) (OUTPATIENT)
Dept: PAIN MEDICINE | Facility: CLINIC | Age: 88
End: 2023-02-12
Payer: MEDICARE

## 2023-02-12 NOTE — PROGRESS NOTES
A MY-CHART MESSAGE HAS BEEN SENT TO THE PATIENT FOR PATIENT ROUNDING WITH [facility].

## 2023-02-20 ENCOUNTER — OUTSIDE FACILITY SERVICE (OUTPATIENT)
Dept: PAIN MEDICINE | Facility: CLINIC | Age: 88
End: 2023-02-20
Payer: MEDICARE

## 2023-02-20 DIAGNOSIS — M48.062 LUMBAR STENOSIS WITH NEUROGENIC CLAUDICATION: Primary | ICD-10-CM

## 2023-02-20 PROCEDURE — 99152 MOD SED SAME PHYS/QHP 5/>YRS: CPT | Performed by: ANESTHESIOLOGY

## 2023-02-20 PROCEDURE — 64493 INJ PARAVERT F JNT L/S 1 LEV: CPT | Performed by: ANESTHESIOLOGY

## 2023-02-20 PROCEDURE — 64494 INJ PARAVERT F JNT L/S 2 LEV: CPT | Performed by: ANESTHESIOLOGY

## 2023-03-01 DIAGNOSIS — M48.062 LUMBAR STENOSIS WITH NEUROGENIC CLAUDICATION: Primary | ICD-10-CM

## 2023-03-06 ENCOUNTER — OUTSIDE FACILITY SERVICE (OUTPATIENT)
Dept: PAIN MEDICINE | Facility: CLINIC | Age: 88
End: 2023-03-06
Payer: MEDICARE

## 2023-03-06 DIAGNOSIS — M48.062 LUMBAR STENOSIS WITH NEUROGENIC CLAUDICATION: Primary | ICD-10-CM

## 2023-03-06 PROCEDURE — 64493 INJ PARAVERT F JNT L/S 1 LEV: CPT | Performed by: ANESTHESIOLOGY

## 2023-03-06 PROCEDURE — 99152 MOD SED SAME PHYS/QHP 5/>YRS: CPT | Performed by: ANESTHESIOLOGY

## 2023-03-06 PROCEDURE — 64494 INJ PARAVERT F JNT L/S 2 LEV: CPT | Performed by: ANESTHESIOLOGY

## 2023-03-07 ENCOUNTER — TELEPHONE (OUTPATIENT)
Dept: PAIN MEDICINE | Facility: CLINIC | Age: 88
End: 2023-03-07
Payer: MEDICARE

## 2023-03-07 NOTE — TELEPHONE ENCOUNTER
LVM for pt regarding how they’re feeling after yesterday’s procedure with Dr. Peguero. Advised of next procedure date and time. Also advised nothing to eat or drink the night prior, must have a  and that as of right now the  can come in, and that the procedure is in the [address]rd floor. Advised pt to contact office as if needed.

## 2023-03-15 ENCOUNTER — OUTSIDE FACILITY SERVICE (OUTPATIENT)
Dept: PAIN MEDICINE | Facility: CLINIC | Age: 88
End: 2023-03-15
Payer: MEDICARE

## 2023-03-15 PROCEDURE — 99152 MOD SED SAME PHYS/QHP 5/>YRS: CPT | Performed by: ANESTHESIOLOGY

## 2023-03-15 PROCEDURE — 64634 DESTROY C/TH FACET JNT ADDL: CPT | Performed by: ANESTHESIOLOGY

## 2023-03-15 PROCEDURE — 64633 DESTROY CERV/THOR FACET JNT: CPT | Performed by: ANESTHESIOLOGY

## 2023-03-16 ENCOUNTER — TELEPHONE (OUTPATIENT)
Dept: PAIN MEDICINE | Facility: CLINIC | Age: 88
End: 2023-03-16
Payer: MEDICARE

## 2023-03-16 NOTE — TELEPHONE ENCOUNTER
FOLLOW-UP CALL AFTER PROCEDURE  I spoke with Justine Salcedo's daughter regarding how patient is feeling after yesterday's procedure with Dr. Peguero. Denisse reports that she is doing well.   Justine Mancia underwent a bilateral lumbar medial branch rhizotomies at L2, L3, L4; for bilateral lumbar facet joints at L3-L4, and L4-L5 on 3/15/2023. Patient was examined in the recovery room after the procedure by Dr. Peguero. Patient reported 100% pain relief. In addition, patient experienced significant functional improvement (performing activities without experiencing pain in comparison with examination prior to the procedure that reproduced pain with those activities).   Pain level before procedure: 8/10   Pain level after procedure: 0/10  Today, Justine Mancia reports 100% ongoing pain relief pain (pain level 0/10)   Patient denies side effects or complications  Patient does not have any questions or concerns at this time

## 2023-06-14 PROBLEM — I48.0 PAROXYSMAL ATRIAL FIBRILLATION: Status: ACTIVE | Noted: 2023-01-01

## 2023-06-14 PROBLEM — I61.0 NONTRAUMATIC SUBCORTICAL HEMORRHAGE OF LEFT CEREBRAL HEMISPHERE: Status: ACTIVE | Noted: 2023-01-01

## 2023-06-14 NOTE — ED PROVIDER NOTES
Subjective   History of Present Illness  Patient is a 89-year-old female presenting from home via EMS secondary to alteration in mental status and concern for stroke process.  Patient was last seen around 1:30 PM when caregiver left.  Family returned and found significant change in mental status with right-sided deficit.  EMS reports flaccid right side with facial asymmetry.  Patient is on chronic anticoagulation, Eliquis, secondary to atrial fibrillation as well as a history of dementia, diabetes, PE, hyperlipidemia, hypertension.    History provided by:  EMS personnel  History limited by:  Patient nonverbal    Review of Systems    Past Medical History:   Diagnosis Date    Arthritis     Atrial fibrillation     Dementia     Diabetes mellitus     dx: approx 3 yrs ago, checks bs twice weekly     Full dentures     will remove before surgery    Gout     History of pulmonary embolus (PE)     post hip replacement    Hx of bladder infections     Hyperlipidemia     Hypertension     Low back pain     Osteoporosis     UTI (urinary tract infection)     Wears glasses        Allergies   Allergen Reactions    Aspirin GI Intolerance       Past Surgical History:   Procedure Laterality Date    APPENDECTOMY      CATARACT EXTRACTION Bilateral     CERVICAL FUSION  1980's    KYPHOPLASTY N/A 3/29/2021    Procedure: KYPHOPLASTY L4;  Surgeon: Mars Pyle MD;  [facility]: [facility];  Service: Neurosurgery;  Laterality: N/A;    LAPAROSCOPIC TUBAL LIGATION      LUMBAR FUSION  1970's    Dr. Corley    LUMBAR LAMINECTOMY  2013    L3/4 and L4/5; Dr. Mars Pyle    OTHER SURGICAL HISTORY Right     bone spur removal - foot       Family History   Problem Relation Age of Onset    Arthritis Mother     Diabetes Mother     Hypertension Mother     Heart disease Father     Hypertension Father     Heart disease Brother     Tuberculosis Brother        Social History     Socioeconomic History    Marital status:    Tobacco Use    Smoking status:  Never    Smokeless tobacco: Never   Vaping Use    Vaping Use: Never used   Substance and Sexual Activity    Alcohol use: Never    Drug use: Never    Sexual activity: Defer           Objective   Physical Exam  Vitals and nursing note reviewed.   Constitutional:       Appearance: She is ill-appearing.   Cardiovascular:      Pulses: Normal pulses.   Pulmonary:      Effort: No respiratory distress.   Skin:     General: Skin is warm and dry.   Neurological:      Comments: Significant right-sided deficit.  Gaze deviation to the left.  Facial asymmetry on the right.  No movement of the right arm.  Some movement to painful stimuli of the right leg.  Patient nonverbal.  Limited exam secondary to alteration mental status and nonverbal status.  However, NIH stroke scale approximately ~26.       Critical Care  Performed by: Akhil Daniels MD  Authorized by: Akhil Daniels MD     Critical care provider statement:     Critical care time (minutes):  45    Critical care was necessary to treat or prevent imminent or life-threatening deterioration of the following conditions:  CNS failure or compromise    Critical care was time spent personally by me on the following activities:  Development of treatment plan with patient or surrogate, discussions with consultants, examination of patient, obtaining history from patient or surrogate, ordering and performing treatments and interventions, ordering and review of laboratory studies and ordering and review of radiographic studies    Care discussed with: admitting provider             ED Course  ED Course as of 06/14/23 1855   Wed Jun 14, 2023   1420 I evaluated the patient in the CT scanner with the stroke navigator.  Significant right-sided deficit.  Left thalamic/basal ganglia hemorrhage noted.  Reviewed the CT scanner with the radiologist and personally reviewed the images.  The stroke navigator reviewed it with Dr. Del Toro with neurosurgery/stroke intervention, he advises  against reversal of the patient's factor Xa inhibitor.  All further as per the stroke team. [RS]   1450 CT Head Without Contrast Stroke Protocol  Personally reviewed the images contemporaneously in the CT suite demonstrating the left sided hemorrhage.  See report for details. [RS]   1451 Intracerebral Hemorrhage (ICH) Score - MDCalc  Calculated on Jun 14 2023 3:03 PM  2 points -> 26% mortality. Please note that the ICH score is primarily used as a clinical grading scale and communication tool.  It is not meant to provide prognostic information, and should not be used as a primary means to predict the outcomes of patients with ICH. [RS]   1514 I talked with the family who is now at bedside.  We had a lengthy discussion on the relatively poor prognosis for this patient with high mortality but even more significant long-term disability if she survives.  They do not want any heroic measures and desires a DNR/DNI supportive measures status.  I have notified the stroke navigator.  They advised the patient to be admitted to the telemetry floor. [RS]      ED Course User Index  [RS] Akhil Daniels MD                                           Medical Decision Making  Problems Addressed:  Chronic anticoagulation: chronic illness or injury  Chronic atrial fibrillation: chronic illness or injury  DNR (do not resuscitate): complicated acute illness or injury  Elevated blood pressure reading with diagnosis of hypertension: complicated acute illness or injury  Nontraumatic subcortical hemorrhage of left cerebral hemisphere: complicated acute illness or injury    Amount and/or Complexity of Data Reviewed  Independent Historian: caregiver and EMS  Labs: ordered.  Radiology: ordered. Decision-making details documented in ED Course.  ECG/medicine tests: ordered.    Risk  Prescription drug management.  Decision regarding hospitalization.  Decision not to resuscitate or to de-escalate care because of poor prognosis.    Critical  Care  Total time providing critical care: 45 minutes      Final diagnoses:   Nontraumatic subcortical hemorrhage of left cerebral hemisphere   Chronic anticoagulation   Chronic atrial fibrillation   Elevated blood pressure reading with diagnosis of hypertension   DNR (do not resuscitate)       ED Disposition  ED Disposition       ED Disposition   Decision to Admit    Condition   --    Comment   Level of Care: Telemetry [5]   Diagnosis: Nontraumatic subcortical hemorrhage of left cerebral hemisphere [[id number]]   Admitting Physician: ROXY FERRER [[id number]]   Attending Physician: ROXY FERRER [[id number]]   Certification: I Certify That Inpatient Hospital Services Are Medically Necessary For Greater Than 2 Midnights                 No follow-up provider specified.       Medication List      No changes were made to your prescriptions during this visit.            Akhil Daniels MD  06/14/23 0376

## 2023-06-14 NOTE — PROGRESS NOTES
Asked to review the head CT of this 89-year-old woman who presented with a sudden onset of mutism and right hemiplegia.  She has a small basal ganglia hemorrhage on the left side with no intraventricular extension.  There is a small amount of perilesional edema.  She is on Eliquis.    The literature is not really clear on what to do in a circumstance like this, however is certain that the available medicines we have on formulary which include FFP and Kcentra are not effective rapidly reversing Eliquis.  Given her age and her history of cognitive decline, I certainly would not recommend using Kcentra in the setting since it is expensive and does not really work in the setting of Eliquis anyway.    Again given her age, the location of the hemorrhage, and her baseline level of functioning, I suspect that this is probably going to be a terminal event for her.  There is certainly no role for neurosurgical intervention.  My recommendation is for blood pressure control.    Given the spontaneous nature of this hemorrhage I think she probably needs to come off of her anticoagulants permanently with the understanding that this will increase her risk of ischemic stroke.

## 2023-06-14 NOTE — LETTER
[facility]  [address] [address]  [phone]        Selena 15, 2023      Patient: Justine Mancia  YOB: 1933  Date of Visit: 6/14/2023      For admission to inpatient hospice at [facility]  Referring Dr La Dominique  Attending Carli Patton  Certifying Dr Frankie Singer

## 2023-06-14 NOTE — PLAN OF CARE
Goal Outcome Evaluation:   Patient vital signs are stable and on room air. Patient appears comfortable and no signs of nausea and shortness of air. Family at the bedside. Fall and safety precautions maintained.

## 2023-06-14 NOTE — CONSULTS
Stroke Consult Note    Patient Name: Justine Mancia   MRN: [id number]  Age: 89 y.o.  Sex: female  : 3/24/1933    Primary Care Physician: Dino Godinez PA-C  Referring Physician:  Dr. Akhil Daniels    TIME STROKE TEAM CALLED: 1422 EST     TIME PATIENT SEEN: 1426 EST    Handedness: Unknown  Race:      Chief Complaint/Reason for Consultation: Right-sided weakness and mute    HPI: Mrs. Mancia is a 89 year old female with known medical diagnoses of essential hypertension, hyperlipidemia, atrial fibrillation (on chronic Eliquis), diabetes mellitus type 2, dementia, and provoked PE (s/p hip replacement) who presents to [facility] via EMS after daughter found her with right-sided weakness and aphasia.  Per EMS report the patient has a caregiver who left the patient at approximately 1330 in her normal state of health.  The family's returned and noted that the patient was unable to move her right side or speak prompting them to contact EMS who brought her to our facility for further evaluation.    EMS reports that the patient does take Eliquis daily and family reports compliance with this medication.  Medical history was obtained from the patient's EMR as there is currently no family present.    Last Known Normal Date/Time: 1330 EST     Review of Systems   Unable to perform ROS: Patient nonverbal      Past Medical History:   Diagnosis Date    Arthritis     Atrial fibrillation     Dementia     Diabetes mellitus     dx: approx 3 yrs ago, checks bs twice weekly     Full dentures     will remove before surgery    Gout     History of pulmonary embolus (PE)     post hip replacement    Hx of bladder infections     Hyperlipidemia     Hypertension     Low back pain     Osteoporosis     UTI (urinary tract infection)     Wears glasses      Past Surgical History:   Procedure Laterality Date    APPENDECTOMY      CATARACT EXTRACTION Bilateral     CERVICAL FUSION      KYPHOPLASTY N/A 3/29/2021     Procedure: KYPHOPLASTY L4;  Surgeon: Mars Pyle MD;  Location: [facility];  Service: Neurosurgery;  Laterality: N/A;    LAPAROSCOPIC TUBAL LIGATION      LUMBAR FUSION  1970's    Dr. Corley    LUMBAR LAMINECTOMY  2013    L3/4 and L4/5; Dr. Mars Pyle    OTHER SURGICAL HISTORY Right     bone spur removal - foot     Family History   Problem Relation Age of Onset    Arthritis Mother     Diabetes Mother     Hypertension Mother     Heart disease Father     Hypertension Father     Heart disease Brother     Tuberculosis Brother      Social History     Socioeconomic History    Marital status:    Tobacco Use    Smoking status: Never    Smokeless tobacco: Never   Vaping Use    Vaping Use: Never used   Substance and Sexual Activity    Alcohol use: Never    Drug use: Never    Sexual activity: Defer     Allergies   Allergen Reactions    Aspirin GI Intolerance     Prior to Admission medications    Medication Sig Start Date End Date Taking? Authorizing Provider   allopurinol (ZYLOPRIM) 100 MG tablet Take 100 mg by mouth 2 (Two) Times a Day. 2/4/21   Emilio Landis MD   atorvastatin (LIPITOR) 40 MG tablet Take 40 mg by mouth Every Night.    Emilio Landis MD   Calcium Carbonate-Vitamin D (CALTRATE 600+D PO) Take 1 tablet by mouth Daily. 800 IU VIT D3    Emilio Landis MD   Eliquis 5 MG tablet tablet  2/3/23   Emilio Landis MD   Gel Base gel 2 g 4 (Four) Times a Day. prilocaine 2%, lidocaine 10%, cyclobenzaprine 4%, capsaicin 0.001% and mannitol 20% 2/7/23   William Peguero MD   glipizide (GLUCOTROL) 5 MG tablet Take 5 mg by mouth Daily. 2/4/21   Emilio Landis MD   losartan (COZAAR) 50 MG tablet Take 50 mg by mouth Daily. 10/10/22   Emilio Landis MD   memantine (NAMENDA) 10 MG tablet Take 10 mg by mouth Daily. 2/4/21   Emilio Landis MD   metFORMIN (GLUCOPHAGE) 500 MG tablet Take 500 mg by mouth 2 (Two) Times a Day With Meals. 11/11/22   Emilio Landis MD    metoprolol succinate XL (TOPROL-XL) 50 MG 24 hr tablet Take 25 mg by mouth 2 (two) times a day. Cut in half    Emilio Landis MD   nitrofurantoin (MACRODANTIN) 100 MG capsule Take 100 mg by mouth Daily. Maintenance for frequent uti 2/4/21   Emilio Landis MD   potassium chloride (MICRO-K) 10 MEQ CR capsule Take 10 mEq by mouth Daily.    Emilio Landis MD   predniSONE (DELTASONE) 10 MG tablet Take 10 mg by mouth Daily. 2/4/21   Emilio Landis MD   traMADol (ULTRAM) 50 MG tablet Take 0.5 tablets by mouth 2 (Two) Times a Day As Needed for Moderate Pain or Severe Pain. 1/10/23   Paresh Ortega PA-C         Heart Rate:  [98] 98  Resp:  [14] 14  BP: (142)/(88) 142/88  Neurological Exam  Mental Status  Alert. Orientation: Unable to assess secondary to aphasia. Patient is nonverbal. Expressive aphasia and receptive aphasia present.  Patient occasionally will follow one-step commands.    Cranial Nerves  CN II: Vision test: Partial left gaze palsy  Complete right homonymous hemianopia. Right homonymous hemianopsia.  CN III, IV, VI: Pupils equal round and reactive to light bilaterally. Partial left gaze palsy.  CN V: Difficult to determine secondary to patient's aphasia.  CN VII:  Right: There is peripheral facial weakness.  CN VIII: Hearing appears to be intact bilaterally.    Motor  Decreased muscle bulk throughout. No fasciculations present. Decreased muscle tone.  Left upper extremity with 4+/5 strength and left lower extremity in 2/5 strength   Right upper extremity with 1/5 strength and right lower extremity with 2/5 strength.    Sensory  Light touch abnormality: Right upper and lower extremity.     Coordination    No obvious dysmetria.    Gait    No observed.    Physical Exam  Vitals reviewed.   Constitutional:       General: She is not in acute distress.     Appearance: She is ill-appearing.   HENT:      Head: Normocephalic and atraumatic.      Mouth/Throat:      Mouth: Mucous membranes  are dry.   Eyes:      Pupils: Pupils are equal, round, and reactive to light.      Comments: Partial left gaze palsy  Complete right homonymous hemianopia   Cardiovascular:      Rate and Rhythm: Normal rate. Rhythm irregular.   Pulmonary:      Effort: Pulmonary effort is normal. No respiratory distress.      Comments: On room air  Skin:     General: Skin is warm and dry.   Neurological:      Mental Status: She is alert.      Cranial Nerves: Cranial nerve deficit present.      Sensory: Sensory deficit present.      Motor: Weakness present.   Psychiatric:         Attention and Perception: She is inattentive.         Mood and Affect: Affect is flat.         Speech: She is noncommunicative.         Behavior: Behavior is slowed. Behavior is cooperative.         Cognition and Memory: Cognition is impaired. Memory is impaired.       Acute Stroke Data    Thrombolytic Inclusion / Exclusion Criteria    Time: 14:44 EDT  Person Administering Scale: PEG Costello    Inclusion Criteria  [x]   18 years of age or greater   []   Onset of symptoms < 4.5 hours before beginning treatment (stroke onset = time patient was last seen well or without symptoms).   []   Diagnosis of acute ischemic stroke causing measurable disabling deficit (Complete Hemianopia, Any Aphasia, Visual or Sensory Extinction, Any weakness limiting sustained effort against gravity)   []   Any remaining deficit considered potentially disabling in view of patient and practitioner   Exclusion criteria (Do not proceed with Alteplase if any are checked under exclusion criteria)  []   Onset unknown or GREATER than 4.5 hours   [x]   ICH on CT/MRI   []   CT demonstrates hypodensity representing acute or subacute infarct   []   Significant head trauma or prior stroke in the previous 3 months   []   Symptoms suggestive of subarachnoid hemorrhage   []   History of un-ruptured intracranial aneurysm GREATER than 10 mm   []   Recent intracranial or intraspinal  surgery within the last 3 months   []   Arterial puncture at a non-compressible site in the previous 7 days   []   Active internal bleeding   []   Acute bleeding tendency   []   Platelet count LESS than 100,000 for known hematological diseases such as leukemia, thrombocytopenia or chronic cirrhosis   []   Current use of anticoagulant with INR GREATER than 1.7 or PT GREATER than 15 seconds, aPTT GREATER than 40 seconds   []   Heparin received within 48 hours, resulting in abnormally elevated aPTT GREATER than upper limit of normal   []   Current use of direct thrombin inhibitors or direct factor Xa inhibitors in the past 48 hours   []   Elevated blood pressure refractory to treatment (systolic GREATER than 185 mm/Hg or diastolic  GREATER than 110 mm/Hg   []   Suspected infective endocarditis and aortic arch dissection   []   Current use of therapeutic treatment dose of low-molecular-weight heparin (LMWH) within the previous 24 hours   []   Structural GI malignancy or bleed   Relative exclusion for all patients  []   Only minor non-disabling symptoms   []   Pregnancy   []   Seizure at onset with postictal residual neurological impairments   []   Major surgery or previous trauma within past 14 days   []   History of previous spontaneous ICH, intracranial neoplasm, or AV malformation   []   Postpartum (within previous 14 days)   []   Recent GI or urinary tract hemorrhage (within previous 21 days)   []   Recent acute MI (within previous 3 months)   []   History of un-ruptured intracranial aneurysm LESS than 10 mm   []   History of ruptured intracranial aneurysm   []   Blood glucose LESS than 50 mg/dL (2.7 mmol/L)   []   Dural puncture within the last 7 days   []   Known GREATER than 10 cerebral microbleeds   Additional exclusions for patients with symptoms onset between 3 and 4.5 hours.  [x]   Age > 80.   []   On any anticoagulants regardless of INR  >>> Warfarin (Coumadin), Heparin, Enoxaparin (Lovenox), fondaparinux  (Arixtra), bivalirudin (Angiomax), Argatroban, dabigatran (Pradaxa), rivaroxaban (Xarelto), or apixaban (Eliquis)   []   Severe stroke (NIHSS > 25).   []   History of BOTH diabetes and previous ischemic stroke.   []   The risks and benefits have been discussed with the patient or family related to the administration of IV thrombolytic therapy for stroke symptoms.   []   I have discussed and reviewed the patient's case and imaging with the attending prior to IV thrombolytic therapy.   N/A  Time IV thrombolytic administered       Hospital Meds:  Scheduled- prothrombin complex conc human, 50 Units/kg, Intravenous, Once      Infusions- niCARdipine, 5-15 mg/hr       PRNs-   sodium chloride    Functional Status Prior to Current Stroke/[place] Score: 5    NIH Stroke Scale  Time: 14:44 EDT  Person Administering Scale: PEG Costello  Interval: baseline  1a. Level of Consciousness: 0-->Alert, keenly responsive  1b. LOC Questions: 2-->Answers neither question correctly  1c. LOC Commands: 1-->Performs one task correctly  2. Best Gaze: 1-->Partial gaze palsy, gaze is abnormal in one or both eyes, but forced deviation or total gaze paresis is not present  3. Visual: 2-->Complete hemianopia  4. Facial Palsy: 2-->Partial paralysis (total or near-total paralysis of lower face)  5a. Motor Arm, Left: 0-->No drift, limb holds 90 (or 45) degrees for full 10 secs  5b. Motor Arm, Right: 3-->No effort against gravity, limb falls  6a. Motor Leg, Left: 3-->No effort against gravity, leg falls to bed immediately  6b. Motor Leg, Right: 3-->No effort against gravity, leg falls to bed immediately  7. Limb Ataxia: 0-->Absent  8. Sensory: 2-->Severe to total sensory loss, patient is not aware of being touched in the face, arm, and leg  9. Best Language: 3-->Mute, global aphasia, no usable speech or auditory comprehension  10. Dysarthria: 2-->Severe dysarthria, patients speech is so slurred as to be unintelligible in the absence of  or out of proportion to any dysphasia, or is mute/anarthric  11. Extinction and Inattention (formerly Neglect): 2-->Profound abdelrahman-inattention/extinction more than 1 modality    Total (NIH Stroke Scale): 26    ICH Score:  1 Point (GCS 5 to 12)  0 Points (ICH volume < 30 cm3)  0 Points (Intraventricular Extension Absent)   0 Points (Infratentorial Origin - No )  1 Point (Age =/> 80 years)  The total ICS Score for this patient is 2 at 15:04 EDT on 06/14/23    Results Reviewed:  I have personally reviewed current lab, radiology, and data and agree with results.    CT head without contrast reveals hemorrhage within the left basal ganglia, mild amount of cerebral edema.    WBC 12.70  H/H 12.6/38.5  Platelets 114  AST 54  ALT 60    Assessment/Plan:    This is a 89 year old female with known medical diagnoses of essential hypertension, hyperlipidemia, atrial fibrillation (on chronic Eliquis), diabetes mellitus type 2, dementia, and provoked PE (s/p hip replacement) who presents to [facility] via EMS after daughter found her with right-sided weakness and aphasia.  On arrival to the emergency department the patient was taken for stat imaging and was found to have a left basal ganglia hemorrhage.  I discussed patient with Dr. Del Toro, neurosurgery, who confirms that the patient is not a surgical candidate and that he would not recommend reversing the patient's Eliquis as this would not affect the patient's overall outcome/prognosis.    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 Del Toro who does not recommend reversal of oral anticoagulation as this places patient at high risk for ischemic stroke and will not have any benefit towards the  patient's overall outcome  -No role for surgical intervention  -N.p.o.  -Bedrest, fall precautions  -PT/OT/SLP; suspect patient will have severe dysarthria and will need further evaluation with FEES     Essential hypertension  -Strict blood pressure management, SBP <140  -Nicardipine for SBP >140     Hyperlipidemia  -Lipid panel in a.m.  -Patient previously on atorvastatin 40 mg nightly, will hold given slightly elevated LFTs.    Atrial fibrillation, chronic  -Hold Eliquis  -Rate control per hospitalist    Diabetes mellitus type 2  -A1c in AM  -Management per hospitalist  -Maintain euglycemia, goal blood glucose <140     Dementia  -Continue Namenda 10 mg daily if patient is able to safely swallow    Plan of care was discussed with Dr. Del Toro and Dr. Daniels.  The patient will be admitted to the hospital service for further work-up and evaluation.  Stroke neurology will continue to follow.  Please call their with any questions or concerns.  Thank you for this consult.      Attempted to contact patient's daughter via telephone, left message for her to call back.  With increased age and severe neurological deficit suspect this will be a terminal event for the patient.  We will discuss possibility of palliative/hospice care with daughter when she is available.    Addendum 1545: Met with patient's family at bedside.  Scans and prognosis were discussed.  At this time family wishes to proceed with comfort measures only.  [facility] on [facility] fifth floor.  Inpatient palliative care consult ordered.  Stroke order set discontinued.    Stroke neurology will sign off for now.  Please call with any questions or concerns.    Melida Hull, APRN  June 14, 2023  14:44 EDT

## 2023-06-14 NOTE — CASE MANAGEMENT/SOCIAL WORK
Discharge Planning Assessment  [facility]     Patient Name: Justine Mancia  MRN: [id number]  Today's Date: 6/14/2023    Admit Date: 6/14/2023    Plan: IDP   Discharge Needs Assessment       Row Name 06/14/23 1641       Living Environment    People in Home sibling(s)    Name(s) of People in Home Denisse Mariano    Current Living Arrangements home    Potentially Unsafe Housing Conditions unable to assess    Primary Care Provided by child(madhu)    Provides Primary Care For no one, unable/limited ability to care for self    Family Caregiver if Needed child(madhu), adult    Family Caregiver Names Denisse Mariano    Quality of Family Relationships involved;helpful;supportive       Transition Planning    Patient/Family Anticipates Transition to long-term care facility    Transportation Anticipated other (see comments)  may need assistance       Discharge Needs Assessment    Readmission Within the Last 30 Days no previous admission in last 30 days    Equipment Currently Used at Home wheelchair;walker, standard;lift device;shower chair;grab bar;commode    Equipment Needed After Discharge other (see comments)  tbd    Discharge Facility/Level of Care Needs other (see comments)  tbd                   Discharge Plan       Row Name 06/14/23 2753       Plan    Plan IDP    Plan Comments MSW met with pt. and family at bedside. Pt. lives with her daughter Denisse Mariano in [facility]. Pt.'s PCP is Cindy Carmona. Pt.'s pharmacy is [facility]. Pt.'s insurance is [employer] Replacement. Pt. is dependent at baseline. Pt. has a walker, w/c, lift chair, shower chair, BSC, and grab bars. Pt. has  care 4 days per week. Pt.'s daughter wants LTC. Pt. may need assistance with transportation. Pt. has an advanced directive and ACP documentation on file. CM will continue to follow pt. throughout her stay.    Final Discharge Disposition Code 30 - still a patient                  Continued Care and Services - Admitted Since 6/14/2023     Coordination has not been started for this encounter.          Demographic Summary       Row Name 06/14/23 1640       General Information    Admission Type inpatient    Arrived From home    Referral Source admission list;emergency department    Reason for Consult discharge planning    Preferred Language English                   Functional Status       Row Name 06/14/23 1640       Functional Status, IADL    Medications completely dependent    Meal Preparation completely dependent    Housekeeping completely dependent    Laundry completely dependent    Shopping completely dependent       Mental Status Summary    Recent Changes in Mental Status/Cognitive Functioning unable to assess       Employment/    Employment Status retired                   Psychosocial    No documentation.                  Abuse/Neglect    No documentation.                  Legal    No documentation.                  Substance Abuse    No documentation.                  Patient Forms    No documentation.                     KULWINDER Najera

## 2023-06-14 NOTE — H&P
[facility] Medicine Services  HISTORY AND PHYSICAL    Patient Name: Justine Mancia  : 3/24/1933  MRN: [id number]  Primary Care Physician: Cindy Carmona MD  Date of admission: 2023      Subjective   Subjective     Chief Complaint:  Hemorrhagic CVA    HPI:  Justine Mancia is a 89 y.o. female with PMH of essential hypertension, hyperlipidemia, atrial fibrillation (on chronic Eliquis), diabetes mellitus type 2, dementia, and provoked PE (s/p hip replacement) who presented with acute onset of aphasia and R sided weakness. Pt has had a rapid functional decline over the last 2-3 weeks, going from being able to walk to wheelchair bound. She was seen by her PCP three weeks ago who had referred them to Cardiology for further workup but pt was still awaiting appt. This am, pt was eating with her caregiver and then, when caregiver went to leave, she stated that pt had fallen asleep- daughter went to check on the patient and patient was minimally responsive and could not speak.  Noted to have R facial droop as well.       Review of Systems   SOHEILA given pt's mental status        Personal History     Past Medical History:   Diagnosis Date   • Arthritis    • Atrial fibrillation    • Dementia    • Diabetes mellitus     dx: approx 3 yrs ago, checks bs twice weekly    • Full dentures     will remove before surgery   • Gout    • History of pulmonary embolus (PE)     post hip replacement   • Hx of bladder infections    • Hyperlipidemia    • Hypertension    • Low back pain    • Osteoporosis    • UTI (urinary tract infection)    • Wears glasses              Past Surgical History:   Procedure Laterality Date   • APPENDECTOMY     • CATARACT EXTRACTION Bilateral    • CERVICAL FUSION     • KYPHOPLASTY N/A 3/29/2021    Procedure: KYPHOPLASTY L4;  Surgeon: Mars Pyle MD;  [facility];  Service: Neurosurgery;  Laterality: N/A;   • LAPAROSCOPIC TUBAL LIGATION     • LUMBAR FUSION   1970's    Dr. Corley   • LUMBAR LAMINECTOMY  2013    L3/4 and L4/5; Dr. Mars Pyle   • OTHER SURGICAL HISTORY Right     bone spur removal - foot       Family History: family history includes Arthritis in her mother; Diabetes in her mother; Heart disease in her brother and father; Hypertension in her father and mother; Tuberculosis in her brother.     Social History:  reports that she has never smoked. She has never used smokeless tobacco. She reports that she does not drink alcohol and does not use drugs.  Social History     Social History Narrative   • Not on file       Medications:  Available home medication information reviewed.  (Not in a hospital admission)    No current facility-administered medications on file prior to encounter.     Current Outpatient Medications on File Prior to Encounter   Medication Sig Dispense Refill   • allopurinol (ZYLOPRIM) 100 MG tablet Take 100 mg by mouth 2 (Two) Times a Day.     • atorvastatin (LIPITOR) 40 MG tablet Take 40 mg by mouth Every Night.     • Calcium Carbonate-Vitamin D (CALTRATE 600+D PO) Take 1 tablet by mouth Daily. 800 IU VIT D3     • Eliquis 5 MG tablet tablet      • Gel Base gel 2 g 4 (Four) Times a Day. prilocaine 2%, lidocaine 10%, cyclobenzaprine 4%, capsaicin 0.001% and mannitol 20% 240 g 5   • glipizide (GLUCOTROL) 5 MG tablet Take 5 mg by mouth Daily.     • losartan (COZAAR) 50 MG tablet Take 50 mg by mouth Daily.     • memantine (NAMENDA) 10 MG tablet Take 10 mg by mouth Daily.     • metFORMIN (GLUCOPHAGE) 500 MG tablet Take 500 mg by mouth 2 (Two) Times a Day With Meals.     • metoprolol succinate XL (TOPROL-XL) 50 MG 24 hr tablet Take 25 mg by mouth 2 (two) times a day. Cut in half     • nitrofurantoin (MACRODANTIN) 100 MG capsule Take 100 mg by mouth Daily. Maintenance for frequent uti     • potassium chloride (MICRO-K) 10 MEQ CR capsule Take 10 mEq by mouth Daily.     • predniSONE (DELTASONE) 10 MG tablet Take 10 mg by mouth Daily.     • traMADol  (ULTRAM) 50 MG tablet Take 0.5 tablets by mouth 2 (Two) Times a Day As Needed for Moderate Pain or Severe Pain. 30 tablet 0       Allergies   Allergen Reactions   • Aspirin GI Intolerance       Objective   Objective     Vital Signs:   Temp:  [97.3 °F (36.3 °C)] 97.3 °F (36.3 °C)  Heart Rate:  [90-98] 93  Resp:  [14] 14  BP: (121-149)/(74-88) 121/74  Total (NIH Stroke Scale): 26    Physical Exam   Constitutional: unable to wake  Eyes: PERRLA, sclerae anicteric, no conjunctival injection  HENT: NCAT, mucous membranes moist  Neck: Supple, no thyromegaly, no lymphadenopathy, trachea midline  Respiratory: Clear to auscultation bilaterally, nonlabored respirations   Cardiovascular: RRR, no murmurs, rubs, or gallops, palpable pedal pulses bilaterally  Gastrointestinal: Positive bowel sounds, soft, nontender, nondistended  Musculoskeletal: No bilateral ankle edema, no clubbing or cyanosis to extremities  Psychiatric: SOHEILA  Neurologic: SOHEILA  Skin: No rashes    Result Review:  I have personally reviewed the results from the time of this admission to 6/14/2023 17:01 EDT and agree with these findings:  [x]  Laboratory list / accordion  [x]  Microbiology  [x]  Radiology  []  EKG/Telemetry   []  Cardiology/Vascular   []  Pathology  [x]  Old records  []  Other:  Most notable findings include: see assessment and plan      LAB RESULTS:      Lab 06/14/23  1456 06/14/23  1445 06/14/23  1441   WBC  --  12.70*  --    HEMOGLOBIN  --  12.6  --    HEMATOCRIT  --  38.5  --    PLATELETS  --  114*  --    NEUTROS ABS  --  8.96*  --    IMMATURE GRANS (ABS)  --  0.06*  --    LYMPHS ABS  --  2.62  --    MONOS ABS  --  0.85  --    EOS ABS  --  0.17  --    MCV  --  104.1*  --    PROTIME 18.9  --  18.9*   INR 2*  --  1.6*   APTT  --  29.7  --              Lab 06/14/23  1445   ALT (SGPT) 60*   AST (SGOT) 54*         Lab 06/14/23  1445   HSTROP T 83*                     Microbiology Results (last 10 days)     ** No results found for the last 240 hours.  **          CT Angiogram Neck    Result Date: 6/14/2023  CT ANGIOGRAM HEAD W AI ANALYSIS OF LVO, CT ANGIOGRAM NECK Date of Exam: 6/14/2023 2:37 PM EDT Indication: Neuro deficit, acute stroke suspected Neuro deficit, acute stroke suspected. Comparison: Noncontrast head CT from the same date Technique: CTA of the head was performed after the uneventful intravenous administration of 75 mL Isovue-370. Reconstructed coronal and sagittal images were also obtained. In addition, a 3-D volume rendered image was created for interpretation. Automated  exposure control and iterative reconstruction methods were used. FINDINGS: Vascular Findings: Atherosclerotic plaque is seen within the right carotid bifurcation and right carotid siphon. The right common carotid, internal carotid, middle cerebral, anterior cerebral, vertebral, and posterior cerebral arteries are patent without abrupt cut off or aneurysmal dilation. There is fetal origin of the right posterior cerebral artery. Atherosclerotic plaque is seen within the left carotid bifurcation and left carotid siphon. Estimated 30 to 40% stenosis within the left proximal ICA. The left common carotid, remainder of the left ICA, middle cerebral, anterior cerebral, vertebral, and posterior cerebral arteries are patent without abrupt cut off. 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 There is focal moderate stenosis within the left vertebral artery at the C1-C2 level. Basilar artery appears patent and appears unremarkable. Non-vascular Findings: Intraparenchymal hematoma centered within the left thalamus/internal capsule measuring approximately 3 x 1.7 cm. Findings compatible with chronic microvascular ischemic change and diffuse cortical atrophy. Please refer to the noncontrast head CT from today for additional details. No acute abnormality is identified within the visualized soft tissue or bony structures of the  neck. The visualized lung apices are clear.     Impression: 1.Intraparenchymal hematoma centered within the left thalamus/internal capsule measuring approximately 3 x 1.7 cm. Please refer to the noncontrast head CT from today for additional details. 2.No acute abnormality of the large vessels of the head or neck. 3.3 mm aneurysm or P-comm infundibulum projects posteriorly from the left supraclinoid ICA (series 9, image 277). 4.Atherosclerotic plaque within the bilateral carotid bifurcations and bilateral carotid siphons. Estimated 30 to 40% stenosis within the left proximal ICA. 5.Additional findings as detailed above Electronically Signed: James Govea  6/14/2023 2:56 PM EDT  Workstation ID: [id number]    XR Chest 1 View    Result Date: 6/14/2023  XR CHEST 1 VW Date of Exam: 6/14/2023 2:51 PM EDT Indication: Acute Stroke Protocol (onset < 12 hrs) Comparison: No recent comparison studies. Chest radiograph 9/10/2008 Findings: Patient is rotated to the left. Allowing for this the heart shadow is normal in size. There does appear to be a small area of discoid atelectasis in the left base but the lungs otherwise appear clear. No edema, effusion or pneumothorax is seen. Appearance of scoliosis on this study is not present on the prior exam, and may be partly positional. Bony structures appear intact.     Impression: Impression: Mild left basilar discoid atelectasis. Electronically Signed: Sid Haywood  6/14/2023 3:11 PM EDT  Workstation ID: [id number]    CT Head Without Contrast Stroke Protocol    Result Date: 6/14/2023  CT HEAD WO CONTRAST STROKE PROTOCOL Date of Exam: 6/14/2023 2:36 PM EDT Indication: Neuro deficit, acute, stroke suspected Neuro deficit, acute stroke suspected. Comparison: None available. Technique: Axial CT images were obtained of the head without contrast administration.  Reconstructed coronal images were also obtained. Automated exposure control and iterative construction methods were used. Scan Time:  1430 Results discussed with Dr. Daniels at 1433, 6/14/2023. Findings: There is an acute parenchymal hemorrhage centered in the left basal ganglia and left thalamus, 25 x 16 mm in axial dimensions, with calculated volume of 3.0 mL by ABC/2 method. There is no evidence of intraventricular hemorrhage, posterior fossa hemorrhage, or other hemorrhage elsewhere. Remainder the scan shows advanced generalized cerebral atrophy and extensive chronic appearing central white matter changes. There is no evidence of mass or significant mass effect or abnormal extra-axial collection. Bone window images show the calvarium to appear intact. Included portions of the paranasal sinuses and mastoids appear grossly normal. There is evidence of previous corneal surgery. Orbits otherwise appear unremarkable.     Impression: Impression: 1. Acute parenchymal hemorrhage in the left basal ganglia and left thalamus, with estimated volume of 3.0 mL. No evidence of intraventricular hemorrhage or posterior fossa hemorrhage. 2. No evidence of other acute intracranial disease elsewhere. Electronically Signed: Sid Haywood  6/14/2023 2:47 PM EDT  Workstation ID: [id number]    CT Angiogram Head w AI Analysis of LVO    Result Date: 6/14/2023  CT ANGIOGRAM HEAD W AI ANALYSIS OF LVO, CT ANGIOGRAM NECK Date of Exam: 6/14/2023 2:37 PM EDT Indication: Neuro deficit, acute stroke suspected Neuro deficit, acute stroke suspected. Comparison: Noncontrast head CT from the same date Technique: CTA of the head was performed after the uneventful intravenous administration of 75 mL Isovue-370. Reconstructed coronal and sagittal images were also obtained. In addition, a 3-D volume rendered image was created for interpretation. Automated  exposure control and iterative reconstruction methods were used. FINDINGS: Vascular Findings: Atherosclerotic plaque is seen within the right carotid bifurcation and right carotid siphon. The right common carotid, internal carotid, middle  cerebral, anterior cerebral, vertebral, and posterior cerebral arteries are patent without abrupt cut off or aneurysmal dilation. There is fetal origin of the right posterior cerebral artery. Atherosclerotic plaque is seen within the left carotid bifurcation and left carotid siphon. Estimated 30 to 40% stenosis within the left proximal ICA. The left common carotid, remainder of the left ICA, middle cerebral, anterior cerebral, vertebral, and posterior cerebral arteries are patent without abrupt cut off. 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 There is focal moderate stenosis within the left vertebral artery at the C1-C2 level. Basilar artery appears patent and appears unremarkable. Non-vascular Findings: Intraparenchymal hematoma centered within the left thalamus/internal capsule measuring approximately 3 x 1.7 cm. Findings compatible with chronic microvascular ischemic change and diffuse cortical atrophy. Please refer to the noncontrast head CT from today for additional details. No acute abnormality is identified within the visualized soft tissue or bony structures of the neck. The visualized lung apices are clear.     Impression: 1.Intraparenchymal hematoma centered within the left thalamus/internal capsule measuring approximately 3 x 1.7 cm. Please refer to the noncontrast head CT from today for additional details. 2.No acute abnormality of the large vessels of the head or neck. 3.3 mm aneurysm or P-comm infundibulum projects posteriorly from the left supraclinoid ICA (series 9, image 277). 4.Atherosclerotic plaque within the bilateral carotid bifurcations and bilateral carotid siphons. Estimated 30 to 40% stenosis within the left proximal ICA. 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 Mancia is a 89 yo WF with PMH of essential hypertension, hyperlipidemia, atrial fibrillation (on chronic Eliquis), diabetes mellitus type 2, dementia, and provoked PE (s/p hip replacement) who presented with acute onset of aphasia and R sided weakness. Pt was found to have a hemorrhagic CVA and family has decided to pursue comfort measures.    Plan:    Acute ICH  -- stop Eliquis  -- no intervention per NSGY   -- family would like comfort measures, consult Inpatient Hospice team in am  -- defer any other medications other than those for comfort    Leukocytosis    TCP    Total time spent: 40 minutes  Time spent includes time reviewing chart, face-to-face time, counseling patient/family/caregiver, ordering medications/tests/procedures, communicating with other health care professionals, documenting clinical information in the electronic health record, and coordination of care.      DVT prophylaxis:  Mechanical due to above      CODE STATUS:    Code Status and Medical Interventions:   Ordered at: 06/14/23 1646     Level Of Support Discussed With:    Health Care Surrogate     Code Status (Patient has no pulse and is not breathing):    No CPR (Do Not Attempt to Resuscitate)     Medical Interventions (Patient has pulse or is breathing):    Comfort Measures       Expected Discharge   Expected Discharge Date: 6/15/2023; Expected Discharge Time:      Avril Patton MD  06/14/23

## 2023-06-15 PROBLEM — I61.9 ICH (INTRACEREBRAL HEMORRHAGE): Status: ACTIVE | Noted: 2023-01-01

## 2023-06-15 NOTE — DISCHARGE SUMMARY
[facility] Medicine Services  DISCHARGE TO INPATIENT HOSPICE    Patient Name: Justine Mancia  : 3/24/1933  MRN: [id number]    Date of Admission: 2023  Date of Discharge:  6/15  Primary Care Physician: Cindy Carmona MD    Consults       Date and Time Order Name Status Description    2023  4:01 PM Inpatient Palliative Care MD Consult Completed     2023  2:34 PM Inpatient Neurology Consult Stroke Completed           Hospital Course     Presenting Problem:   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.0]    Active Hospital Problems    Diagnosis  POA    **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.0]  Yes    Paroxysmal atrial fibrillation [I48.0]  Unknown    History of kyphoplasty [Z98.890]  Not Applicable    Degeneration of lumbar or lumbosacral intervertebral disc [M51.37]  Yes    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          Hospital Course:  Justine Mancia is a 89 y.o. female w Afib on chronic eliquis, dementia who presented w acute aphasia and right sided weakness. Found to have significant acute hemorrhagic CVA, family opted for comfort measures and transitioned to inpatient hospice on 6/15    Day of Discharge     HPI:   Granddaughter bedside as rest of family d/w hospice. Patient resting and appears comfortable    Vital Signs:   Temp:  [97.4 °F (36.3 °C)-97.6 °F (36.4 °C)] 97.4 °F (36.3 °C)  Heart Rate:  [90-97] 97  Resp:  [16] 16  BP: (121-150)/() 150/102     Physical Exam:  Elderly female lying in bed asleep  Resp rate slow, mild noise associated  No spontaneous movement appreciated    Discharge Details     Discharge Disposition:  Transfer care to inpatient Hospice at     Time  Spent on Discharge:  10 minutes    La Dominique MD  06/15/23

## 2023-06-15 NOTE — PROGRESS NOTES
Continued Stay Note  [facility]     Patient Name: Justine Mancia  MRN: [id number]  Today's Date: 6/15/2023    Admit Date: 6/15/2023    Plan: IPU Admission   Discharge Plan       Row Name 06/15/23 1327       Plan    Plan IPU Admission    Plan Comments   Hospice RN, BRANDI visit with family on the patio to discuss inpatient hospice services.  Family is electing this benefit and is agreeable with comfort measures only.  Admission paperwork signed.  Admission approved by DEA RUVALCABA.  Dr. Dominique aware of discharge/readmission to hospice.  Patient was comfortable at this time. Breathing was tachy and morphine given at this time. Patient meets inpatient criteria for skilled nursing assessment, medication titration, and administration of injectable medications for palliation of symptoms.  Discharge is dependent on survival of this hospitalization and becoming appropriate for a lower level of care.            Final Discharge Disposition Code 51 - hospice medical facility                   Discharge Codes    No documentation.                       Audrey Stone RN

## 2023-06-15 NOTE — DISCHARGE PLACEMENT REQUEST
"Justine Mitchell (89 y.o. Female)       Date of Birth   03/24/1933    Social Security Number       Address   [address] [id number]    Home Phone   [phone]    MRN   [id number]       Buddhist   None    Marital Status                               Admission Date   6/14/23    Admission Type   Emergency    Admitting Provider   La Dominique MD    Attending Provider   aL Dominique MD    Department, Room/Bed   [facility] 6A, N617/1       Discharge Date       Discharge Disposition       Discharge Destination                                 Attending Provider: La Dominique MD    Allergies: Aspirin    Isolation: None   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)   Code Status: No CPR    Ht: 162.6 cm (64\")   Wt: 70.3 kg (155 lb)    Admission Cmt: None   Principal Problem: 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.0]                   Active Insurance as of 6/14/2023       Primary Coverage       Payor Plan Insurance Group Employer/Plan Group    ANTHEM MEDICARE REPLACEMENT ANTHEM MEDICARE ADVANTAGE [id number]       Payor Plan Address Payor Plan Phone Number Payor Plan Fax Number Effective Dates    [address] [phone]  1/1/2023 - None Entered    [facility] [id number]         Subscriber Name Subscriber Birth Date Member ID       JUSTINE MITCHELL 3/24/1933 [id number]                     Emergency Contacts        (Rel.) Home Phone Work Phone Mobile Phone    MAYELIN CORONA (Daughter) -- -- [phone]    RUSTY YEN (Grandchild) -- -- [phone]              Emergency Contact Information       Name Relation Home Work Mobile    MAYELIN CORONA Daughter   [phone]    RUSTY YEN Grandchild   [phone]          Insurance Information                  ANTHEM MEDICARE REPLACEMENT/ANTHEM MEDICARE ADVANTAGE Phone: [phone]    Subscriber: Justine Mitchell Subscriber#: [id number]    Group#: [id number] Precert#: --             History & Physical    "     Avril Patton MD at 23 1655              [facility] Medicine Services  HISTORY AND PHYSICAL    Patient Name: Justine Mancia  : 3/24/1933  MRN: [id number]  Primary Care Physician: Cindy Carmona MD  Date of admission: 2023      Subjective    Subjective     Chief Complaint:  Hemorrhagic CVA    HPI:  Justine Mancia is a 89 y.o. female with PMH of essential hypertension, hyperlipidemia, atrial fibrillation (on chronic Eliquis), diabetes mellitus type 2, dementia, and provoked PE (s/p hip replacement) who presented with acute onset of aphasia and R sided weakness. Pt has had a rapid functional decline over the last 2-3 weeks, going from being able to walk to wheelchair bound. She was seen by her PCP three weeks ago who had referred them to Cardiology for further workup but pt was still awaiting appt. This am, pt was eating with her caregiver and then, when caregiver went to leave, she stated that pt had fallen asleep- daughter went to check on the patient and patient was minimally responsive and could not speak.  Noted to have R facial droop as well.       Review of Systems   SOHEILA given pt's mental status        Personal History     Past Medical History:   Diagnosis Date    Arthritis     Atrial fibrillation     Dementia     Diabetes mellitus     dx: approx 3 yrs ago, checks bs twice weekly     Full dentures     will remove before surgery    Gout     History of pulmonary embolus (PE)     post hip replacement    Hx of bladder infections     Hyperlipidemia     Hypertension     Low back pain     Osteoporosis     UTI (urinary tract infection)     Wears glasses              Past Surgical History:   Procedure Laterality Date    APPENDECTOMY      CATARACT EXTRACTION Bilateral     CERVICAL FUSION      KYPHOPLASTY N/A 3/29/2021    Procedure: KYPHOPLASTY L4;  Surgeon: Mars Pyle MD;  Location: [facility];  Service: Neurosurgery;  Laterality: N/A;    LAPAROSCOPIC  TUBAL LIGATION      LUMBAR FUSION  1970's    Dr. Corley    LUMBAR LAMINECTOMY  2013    L3/4 and L4/5; Dr. Mars Pyle    OTHER SURGICAL HISTORY Right     bone spur removal - foot       Family History: family history includes Arthritis in her mother; Diabetes in her mother; Heart disease in her brother and father; Hypertension in her father and mother; Tuberculosis in her brother.     Social History:  reports that she has never smoked. She has never used smokeless tobacco. She reports that she does not drink alcohol and does not use drugs.  Social History     Social History Narrative    Not on file       Medications:  Available home medication information reviewed.  (Not in a hospital admission)    No current facility-administered medications on file prior to encounter.     Current Outpatient Medications on File Prior to Encounter   Medication Sig Dispense Refill    allopurinol (ZYLOPRIM) 100 MG tablet Take 100 mg by mouth 2 (Two) Times a Day.      atorvastatin (LIPITOR) 40 MG tablet Take 40 mg by mouth Every Night.      Calcium Carbonate-Vitamin D (CALTRATE 600+D PO) Take 1 tablet by mouth Daily. 800 IU VIT D3      Eliquis 5 MG tablet tablet       Gel Base gel 2 g 4 (Four) Times a Day. prilocaine 2%, lidocaine 10%, cyclobenzaprine 4%, capsaicin 0.001% and mannitol 20% 240 g 5    glipizide (GLUCOTROL) 5 MG tablet Take 5 mg by mouth Daily.      losartan (COZAAR) 50 MG tablet Take 50 mg by mouth Daily.      memantine (NAMENDA) 10 MG tablet Take 10 mg by mouth Daily.      metFORMIN (GLUCOPHAGE) 500 MG tablet Take 500 mg by mouth 2 (Two) Times a Day With Meals.      metoprolol succinate XL (TOPROL-XL) 50 MG 24 hr tablet Take 25 mg by mouth 2 (two) times a day. Cut in half      nitrofurantoin (MACRODANTIN) 100 MG capsule Take 100 mg by mouth Daily. Maintenance for frequent uti      potassium chloride (MICRO-K) 10 MEQ CR capsule Take 10 mEq by mouth Daily.      predniSONE (DELTASONE) 10 MG tablet Take 10 mg by mouth Daily.       traMADol (ULTRAM) 50 MG tablet Take 0.5 tablets by mouth 2 (Two) Times a Day As Needed for Moderate Pain or Severe Pain. 30 tablet 0       Allergies   Allergen Reactions    Aspirin GI Intolerance       Objective    Objective     Vital Signs:   Temp:  [97.3 °F (36.3 °C)] 97.3 °F (36.3 °C)  Heart Rate:  [90-98] 93  Resp:  [14] 14  BP: (121-149)/(74-88) 121/74  Total (NIH Stroke Scale): 26    Physical Exam   Constitutional: unable to wake  Eyes: PERRLA, sclerae anicteric, no conjunctival injection  HENT: NCAT, mucous membranes moist  Neck: Supple, no thyromegaly, no lymphadenopathy, trachea midline  Respiratory: Clear to auscultation bilaterally, nonlabored respirations   Cardiovascular: RRR, no murmurs, rubs, or gallops, palpable pedal pulses bilaterally  Gastrointestinal: Positive bowel sounds, soft, nontender, nondistended  Musculoskeletal: No bilateral ankle edema, no clubbing or cyanosis to extremities  Psychiatric: SOHEILA  Neurologic: SOHEILA  Skin: No rashes    Result Review:  I have personally reviewed the results from the time of this admission to 6/14/2023 17:01 EDT and agree with these findings:  [x]  Laboratory list / accordion  [x]  Microbiology  [x]  Radiology  []  EKG/Telemetry   []  Cardiology/Vascular   []  Pathology  [x]  Old records  []  Other:  Most notable findings include: see assessment and plan      LAB RESULTS:      Lab 06/14/23  1456 06/14/23  1445 06/14/23  1441   WBC  --  12.70*  --    HEMOGLOBIN  --  12.6  --    HEMATOCRIT  --  38.5  --    PLATELETS  --  114*  --    NEUTROS ABS  --  8.96*  --    IMMATURE GRANS (ABS)  --  0.06*  --    LYMPHS ABS  --  2.62  --    MONOS ABS  --  0.85  --    EOS ABS  --  0.17  --    MCV  --  104.1*  --    PROTIME 18.9  --  18.9*   INR 2*  --  1.6*   APTT  --  29.7  --              Lab 06/14/23  1445   ALT (SGPT) 60*   AST (SGOT) 54*         Lab 06/14/23  1445   HSTROP T 83*                     Microbiology Results (last 10 days)       ** No results found for the  last 240 hours. **            CT Angiogram Neck    Result Date: 6/14/2023  CT ANGIOGRAM HEAD W AI ANALYSIS OF LVO, CT ANGIOGRAM NECK Date of Exam: 6/14/2023 2:37 PM EDT Indication: Neuro deficit, acute stroke suspected Neuro deficit, acute stroke suspected. Comparison: Noncontrast head CT from the same date Technique: CTA of the head was performed after the uneventful intravenous administration of 75 mL Isovue-370. Reconstructed coronal and sagittal images were also obtained. In addition, a 3-D volume rendered image was created for interpretation. Automated  exposure control and iterative reconstruction methods were used. FINDINGS: Vascular Findings: Atherosclerotic plaque is seen within the right carotid bifurcation and right carotid siphon. The right common carotid, internal carotid, middle cerebral, anterior cerebral, vertebral, and posterior cerebral arteries are patent without abrupt cut off or aneurysmal dilation. There is fetal origin of the right posterior cerebral artery. Atherosclerotic plaque is seen within the left carotid bifurcation and left carotid siphon. Estimated 30 to 40% stenosis within the left proximal ICA. The left common carotid, remainder of the left ICA, middle cerebral, anterior cerebral, vertebral, and posterior cerebral arteries are patent without abrupt cut off. 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 There is focal moderate stenosis within the left vertebral artery at the C1-C2 level. Basilar artery appears patent and appears unremarkable. Non-vascular Findings: Intraparenchymal hematoma centered within the left thalamus/internal capsule measuring approximately 3 x 1.7 cm. Findings compatible with chronic microvascular ischemic change and diffuse cortical atrophy. Please refer to the noncontrast head CT from today for additional details. No acute abnormality is identified within the visualized soft tissue or bony  structures of the neck. The visualized lung apices are clear.     Impression: 1.Intraparenchymal hematoma centered within the left thalamus/internal capsule measuring approximately 3 x 1.7 cm. Please refer to the noncontrast head CT from today for additional details. 2.No acute abnormality of the large vessels of the head or neck. 3.3 mm aneurysm or P-comm infundibulum projects posteriorly from the left supraclinoid ICA (series 9, image 277). 4.Atherosclerotic plaque within the bilateral carotid bifurcations and bilateral carotid siphons. Estimated 30 to 40% stenosis within the left proximal ICA. 5.Additional findings as detailed above Electronically Signed: James Govea  6/14/2023 2:56 PM EDT  Workstation ID: [id number]    XR Chest 1 View    Result Date: 6/14/2023  XR CHEST 1 VW Date of Exam: 6/14/2023 2:51 PM EDT Indication: Acute Stroke Protocol (onset < 12 hrs) Comparison: No recent comparison studies. Chest radiograph 9/10/2008 Findings: Patient is rotated to the left. Allowing for this the heart shadow is normal in size. There does appear to be a small area of discoid atelectasis in the left base but the lungs otherwise appear clear. No edema, effusion or pneumothorax is seen. Appearance of scoliosis on this study is not present on the prior exam, and may be partly positional. Bony structures appear intact.     Impression: Impression: Mild left basilar discoid atelectasis. Electronically Signed: Sid Haywood  6/14/2023 3:11 PM EDT  Workstation ID: [id number]    CT Head Without Contrast Stroke Protocol    Result Date: 6/14/2023  CT HEAD WO CONTRAST STROKE PROTOCOL Date of Exam: 6/14/2023 2:36 PM EDT Indication: Neuro deficit, acute, stroke suspected Neuro deficit, acute stroke suspected. Comparison: None available. Technique: Axial CT images were obtained of the head without contrast administration.  Reconstructed coronal images were also obtained. Automated exposure control and iterative construction methods were  used. Scan Time: 1430 Results discussed with Dr. Daniels at 1433, 6/14/2023. Findings: There is an acute parenchymal hemorrhage centered in the left basal ganglia and left thalamus, 25 x 16 mm in axial dimensions, with calculated volume of 3.0 mL by ABC/2 method. There is no evidence of intraventricular hemorrhage, posterior fossa hemorrhage, or other hemorrhage elsewhere. Remainder the scan shows advanced generalized cerebral atrophy and extensive chronic appearing central white matter changes. There is no evidence of mass or significant mass effect or abnormal extra-axial collection. Bone window images show the calvarium to appear intact. Included portions of the paranasal sinuses and mastoids appear grossly normal. There is evidence of previous corneal surgery. Orbits otherwise appear unremarkable.     Impression: Impression: 1. Acute parenchymal hemorrhage in the left basal ganglia and left thalamus, with estimated volume of 3.0 mL. No evidence of intraventricular hemorrhage or posterior fossa hemorrhage. 2. No evidence of other acute intracranial disease elsewhere. Electronically Signed: Sid Haywood  6/14/2023 2:47 PM EDT  Workstation ID: [id number]    CT Angiogram Head w AI Analysis of LVO    Result Date: 6/14/2023  CT ANGIOGRAM HEAD W AI ANALYSIS OF LVO, CT ANGIOGRAM NECK Date of Exam: 6/14/2023 2:37 PM EDT Indication: Neuro deficit, acute stroke suspected Neuro deficit, acute stroke suspected. Comparison: Noncontrast head CT from the same date Technique: CTA of the head was performed after the uneventful intravenous administration of 75 mL Isovue-370. Reconstructed coronal and sagittal images were also obtained. In addition, a 3-D volume rendered image was created for interpretation. Automated  exposure control and iterative reconstruction methods were used. FINDINGS: Vascular Findings: Atherosclerotic plaque is seen within the right carotid bifurcation and right carotid siphon. The right common carotid,  internal carotid, middle cerebral, anterior cerebral, vertebral, and posterior cerebral arteries are patent without abrupt cut off or aneurysmal dilation. There is fetal origin of the right posterior cerebral artery. Atherosclerotic plaque is seen within the left carotid bifurcation and left carotid siphon. Estimated 30 to 40% stenosis within the left proximal ICA. The left common carotid, remainder of the left ICA, middle cerebral, anterior cerebral, vertebral, and posterior cerebral arteries are patent without abrupt cut off. 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 There is focal moderate stenosis within the left vertebral artery at the C1-C2 level. Basilar artery appears patent and appears unremarkable. Non-vascular Findings: Intraparenchymal hematoma centered within the left thalamus/internal capsule measuring approximately 3 x 1.7 cm. Findings compatible with chronic microvascular ischemic change and diffuse cortical atrophy. Please refer to the noncontrast head CT from today for additional details. No acute abnormality is identified within the visualized soft tissue or bony structures of the neck. The visualized lung apices are clear.     Impression: 1.Intraparenchymal hematoma centered within the left thalamus/internal capsule measuring approximately 3 x 1.7 cm. Please refer to the noncontrast head CT from today for additional details. 2.No acute abnormality of the large vessels of the head or neck. 3.3 mm aneurysm or P-comm infundibulum projects posteriorly from the left supraclinoid ICA (series 9, image 277). 4.Atherosclerotic plaque within the bilateral carotid bifurcations and bilateral carotid siphons. Estimated 30 to 40% stenosis within the left proximal ICA. 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 Mancia is a 89 yo WF with PMH of essential hypertension, hyperlipidemia, atrial fibrillation (on chronic Eliquis), diabetes mellitus type 2, dementia, and provoked PE (s/p hip replacement) who presented with acute onset of aphasia and R sided weakness. Pt was found to have a hemorrhagic CVA and family has decided to pursue comfort measures.    Plan:    Acute ICH  -- stop Eliquis  -- no intervention per NSGY   -- family would like comfort measures, consult Inpatient Hospice team in am  -- defer any other medications other than those for comfort    Leukocytosis    TCP    Total time spent: 40 minutes  Time spent includes time reviewing chart, face-to-face time, counseling patient/family/caregiver, ordering medications/tests/procedures, communicating with other health care professionals, documenting clinical information in the electronic health record, and coordination of care.      DVT prophylaxis:  Mechanical due to above      CODE STATUS:    Code Status and Medical Interventions:   Ordered at: 06/14/23 7836     Level Of Support Discussed With:    Health Care Surrogate     Code Status (Patient has no pulse and is not breathing):    No CPR (Do Not Attempt to Resuscitate)     Medical Interventions (Patient has pulse or is breathing):    Comfort Measures       Expected Discharge   Expected Discharge Date: 6/15/2023; Expected Discharge Time:      Avril Patton MD  06/14/23    Electronically  signed by Avril Patton MD at 06/14/23 5941

## 2023-06-15 NOTE — PLAN OF CARE
Goal Outcome Evaluation:   Patient resting comfortably throughout shift. Comfort care measures only, tele d/c. Morphine utilized prn. Doyle catheter placement for comfort attempted x 2 without success. Several family members at bedside.

## 2023-06-15 NOTE — PLAN OF CARE
Goal Outcome Evaluation:         VSS, on RA. Disoriented times 4. Patient is unable to verbalize discomfort however facial and body language showed signs of discomfort, IV morphine and Zofran administered. Patient resting, family at bedside. Bed in lowest position, safety and fall precaution maintained, call light in reach.

## 2023-06-15 NOTE — PAYOR COMM NOTE
"Justine Mitchell (89 y.o. Female)     [id number]     Millie Stephens, RN  Utilization Review  [phone]  [phone]        Date of Birth   1933    Social Security Number       Address   [address] [facility] [address]    Home Phone   [phone]    MRN   [id number]       Faith   None    Marital Status                               Admission Date   23    Admission Type   Emergency    Admitting Provider   La Dominique MD    Attending Provider   La Dominique MD    Department, Room/Bed   [facility] 6A, N617/1       Discharge Date       Discharge Disposition   Hospice/Medical Facility ([facility])    Discharge Destination                                 Attending Provider: La Dominique MD    Allergies: Aspirin    Isolation: None   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)   Code Status: No CPR    Ht: 162.6 cm (64\")   Wt: 70.3 kg (155 lb)    Admission Cmt: None   Principal Problem: 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.0]                   Active Insurance as of 2023       Primary Coverage       Payor Plan Insurance Group Employer/Plan Group    ANTHEM MEDICARE REPLACEMENT ANTHEM MEDICARE ADVANTAGE [id number]       Payor Plan Address Payor Plan Phone Number Payor Plan Fax Number Effective Dates    [address] [phone]  2023 - None Entered    [facility] [address]         Subscriber Name Subscriber Birth Date Member ID       JUSTINE MITCHELL 3/24/1933 [id number]                     Emergency Contacts        (Rel.) Home Phone Work Phone Mobile Phone    MAYELIN CORONA (Daughter) -- -- [phone]    RUSTY YEN (Grandchild) -- -- [phone]                 History & Physical        Avril Patton MD at [address] [place] [address] Medicine Services  HISTORY AND PHYSICAL    Patient Name: Justine Mitchell  : 3/24/1933  MRN: [id number]  Primary Care Physician: " Cindy Carmona MD  Date of admission: 6/14/2023      Subjective    Subjective     Chief Complaint:  Hemorrhagic CVA    HPI:  Jutsine Mancia is a 89 y.o. female with PMH of essential hypertension, hyperlipidemia, atrial fibrillation (on chronic Eliquis), diabetes mellitus type 2, dementia, and provoked PE (s/p hip replacement) who presented with acute onset of aphasia and R sided weakness. Pt has had a rapid functional decline over the last 2-3 weeks, going from being able to walk to wheelchair bound. She was seen by her PCP three weeks ago who had referred them to Cardiology for further workup but pt was still awaiting appt. This am, pt was eating with her caregiver and then, when caregiver went to leave, she stated that pt had fallen asleep- daughter went to check on the patient and patient was minimally responsive and could not speak.  Noted to have R facial droop as well.       Review of Systems   SOHEILA given pt's mental status        Personal History     Past Medical History:   Diagnosis Date    Arthritis     Atrial fibrillation     Dementia     Diabetes mellitus     dx: approx 3 yrs ago, checks bs twice weekly     Full dentures     will remove before surgery    Gout     History of pulmonary embolus (PE)     post hip replacement    Hx of bladder infections     Hyperlipidemia     Hypertension     Low back pain     Osteoporosis     UTI (urinary tract infection)     Wears glasses              Past Surgical History:   Procedure Laterality Date    APPENDECTOMY      CATARACT EXTRACTION Bilateral     CERVICAL FUSION  1980's    KYPHOPLASTY N/A 3/29/2021    Procedure: KYPHOPLASTY L4;  Surgeon: Mars Pyle MD;  Location: [facility];  Service: Neurosurgery;  Laterality: N/A;    LAPAROSCOPIC TUBAL LIGATION      LUMBAR FUSION  1970's    Dr. Corley    LUMBAR LAMINECTOMY  2013    L3/4 and L4/5; Dr. Mars Pyle    OTHER SURGICAL HISTORY Right     bone spur removal - foot       Family History: family history includes  Arthritis in her mother; Diabetes in her mother; Heart disease in her brother and father; Hypertension in her father and mother; Tuberculosis in her brother.     Social History:  reports that she has never smoked. She has never used smokeless tobacco. She reports that she does not drink alcohol and does not use drugs.  Social History     Social History Narrative    Not on file       Medications:  Available home medication information reviewed.  (Not in a hospital admission)    No current facility-administered medications on file prior to encounter.     Current Outpatient Medications on File Prior to Encounter   Medication Sig Dispense Refill    allopurinol (ZYLOPRIM) 100 MG tablet Take 100 mg by mouth 2 (Two) Times a Day.      atorvastatin (LIPITOR) 40 MG tablet Take 40 mg by mouth Every Night.      Calcium Carbonate-Vitamin D (CALTRATE 600+D PO) Take 1 tablet by mouth Daily. 800 IU VIT D3      Eliquis 5 MG tablet tablet       Gel Base gel 2 g 4 (Four) Times a Day. prilocaine 2%, lidocaine 10%, cyclobenzaprine 4%, capsaicin 0.001% and mannitol 20% 240 g 5    glipizide (GLUCOTROL) 5 MG tablet Take 5 mg by mouth Daily.      losartan (COZAAR) 50 MG tablet Take 50 mg by mouth Daily.      memantine (NAMENDA) 10 MG tablet Take 10 mg by mouth Daily.      metFORMIN (GLUCOPHAGE) 500 MG tablet Take 500 mg by mouth 2 (Two) Times a Day With Meals.      metoprolol succinate XL (TOPROL-XL) 50 MG 24 hr tablet Take 25 mg by mouth 2 (two) times a day. Cut in half      nitrofurantoin (MACRODANTIN) 100 MG capsule Take 100 mg by mouth Daily. Maintenance for frequent uti      potassium chloride (MICRO-K) 10 MEQ CR capsule Take 10 mEq by mouth Daily.      predniSONE (DELTASONE) 10 MG tablet Take 10 mg by mouth Daily.      traMADol (ULTRAM) 50 MG tablet Take 0.5 tablets by mouth 2 (Two) Times a Day As Needed for Moderate Pain or Severe Pain. 30 tablet 0       Allergies   Allergen Reactions    Aspirin GI Intolerance       Objective     Objective     Vital Signs:   Temp:  [97.3 °F (36.3 °C)] 97.3 °F (36.3 °C)  Heart Rate:  [90-98] 93  Resp:  [14] 14  BP: (121-149)/(74-88) 121/74  Total (NIH Stroke Scale): 26    Physical Exam   Constitutional: unable to wake  Eyes: PERRLA, sclerae anicteric, no conjunctival injection  HENT: NCAT, mucous membranes moist  Neck: Supple, no thyromegaly, no lymphadenopathy, trachea midline  Respiratory: Clear to auscultation bilaterally, nonlabored respirations   Cardiovascular: RRR, no murmurs, rubs, or gallops, palpable pedal pulses bilaterally  Gastrointestinal: Positive bowel sounds, soft, nontender, nondistended  Musculoskeletal: No bilateral ankle edema, no clubbing or cyanosis to extremities  Psychiatric: SOHEILA  Neurologic: SOHEILA  Skin: No rashes    Result Review:  I have personally reviewed the results from the time of this admission to 6/14/2023 17:01 EDT and agree with these findings:  [x]  Laboratory list / accordion  [x]  Microbiology  [x]  Radiology  []  EKG/Telemetry   []  Cardiology/Vascular   []  Pathology  [x]  Old records  []  Other:  Most notable findings include: see assessment and plan      LAB RESULTS:      Lab 06/14/23  1456 06/14/23  1445 06/14/23  1441   WBC  --  12.70*  --    HEMOGLOBIN  --  12.6  --    HEMATOCRIT  --  38.5  --    PLATELETS  --  114*  --    NEUTROS ABS  --  8.96*  --    IMMATURE GRANS (ABS)  --  0.06*  --    LYMPHS ABS  --  2.62  --    MONOS ABS  --  0.85  --    EOS ABS  --  0.17  --    MCV  --  104.1*  --    PROTIME 18.9  --  18.9*   INR 2*  --  1.6*   APTT  --  29.7  --              Lab 06/14/23  1445   ALT (SGPT) 60*   AST (SGOT) 54*         Lab 06/14/23  1445   HSTROP T 83*                     Microbiology Results (last 10 days)       ** No results found for the last 240 hours. **            CT Angiogram Neck    Result Date: 6/14/2023  CT ANGIOGRAM HEAD W AI ANALYSIS OF LVO, CT ANGIOGRAM NECK Date of Exam: 6/14/2023 2:37 PM EDT Indication: Neuro deficit, acute stroke suspected  Neuro deficit, acute stroke suspected. Comparison: Noncontrast head CT from the same date Technique: CTA of the head was performed after the uneventful intravenous administration of 75 mL Isovue-370. Reconstructed coronal and sagittal images were also obtained. In addition, a 3-D volume rendered image was created for interpretation. Automated  exposure control and iterative reconstruction methods were used. FINDINGS: Vascular Findings: Atherosclerotic plaque is seen within the right carotid bifurcation and right carotid siphon. The right common carotid, internal carotid, middle cerebral, anterior cerebral, vertebral, and posterior cerebral arteries are patent without abrupt cut off or aneurysmal dilation. There is fetal origin of the right posterior cerebral artery. Atherosclerotic plaque is seen within the left carotid bifurcation and left carotid siphon. Estimated 30 to 40% stenosis within the left proximal ICA. The left common carotid, remainder of the left ICA, middle cerebral, anterior cerebral, vertebral, and posterior cerebral arteries are patent without abrupt cut off. 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 There is focal moderate stenosis within the left vertebral artery at the C1-C2 level. Basilar artery appears patent and appears unremarkable. Non-vascular Findings: Intraparenchymal hematoma centered within the left thalamus/internal capsule measuring approximately 3 x 1.7 cm. Findings compatible with chronic microvascular ischemic change and diffuse cortical atrophy. Please refer to the noncontrast head CT from today for additional details. No acute abnormality is identified within the visualized soft tissue or bony structures of the neck. The visualized lung apices are clear.     Impression: 1.Intraparenchymal hematoma centered within the left thalamus/internal capsule measuring approximately 3 x 1.7 cm. Please refer to the  noncontrast head CT from today for additional details. 2.No acute abnormality of the large vessels of the head or neck. 3.3 mm aneurysm or P-comm infundibulum projects posteriorly from the left supraclinoid ICA (series 9, image 277). 4.Atherosclerotic plaque within the bilateral carotid bifurcations and bilateral carotid siphons. Estimated 30 to 40% stenosis within the left proximal ICA. 5.Additional findings as detailed above Electronically Signed: James Govea  6/14/2023 2:56 PM EDT  Workstation ID: [id number]    XR Chest 1 View    Result Date: 6/14/2023  XR CHEST 1 VW Date of Exam: 6/14/2023 2:51 PM EDT Indication: Acute Stroke Protocol (onset < 12 hrs) Comparison: No recent comparison studies. Chest radiograph 9/10/2008 Findings: Patient is rotated to the left. Allowing for this the heart shadow is normal in size. There does appear to be a small area of discoid atelectasis in the left base but the lungs otherwise appear clear. No edema, effusion or pneumothorax is seen. Appearance of scoliosis on this study is not present on the prior exam, and may be partly positional. Bony structures appear intact.     Impression: Impression: Mild left basilar discoid atelectasis. Electronically Signed: Sid Haywood  6/14/2023 3:11 PM EDT  Workstation ID: [id number]    CT Head Without Contrast Stroke Protocol    Result Date: 6/14/2023  CT HEAD WO CONTRAST STROKE PROTOCOL Date of Exam: 6/14/2023 2:36 PM EDT Indication: Neuro deficit, acute, stroke suspected Neuro deficit, acute stroke suspected. Comparison: None available. Technique: Axial CT images were obtained of the head without contrast administration.  Reconstructed coronal images were also obtained. Automated exposure control and iterative construction methods were used. Scan Time: 1430 Results discussed with Dr. Daniels at 1433, 6/14/2023. Findings: There is an acute parenchymal hemorrhage centered in the left basal ganglia and left thalamus, 25 x 16 mm in axial dimensions,  with calculated volume of 3.0 mL by ABC/2 method. There is no evidence of intraventricular hemorrhage, posterior fossa hemorrhage, or other hemorrhage elsewhere. Remainder the scan shows advanced generalized cerebral atrophy and extensive chronic appearing central white matter changes. There is no evidence of mass or significant mass effect or abnormal extra-axial collection. Bone window images show the calvarium to appear intact. Included portions of the paranasal sinuses and mastoids appear grossly normal. There is evidence of previous corneal surgery. Orbits otherwise appear unremarkable.     Impression: Impression: 1. Acute parenchymal hemorrhage in the left basal ganglia and left thalamus, with estimated volume of 3.0 mL. No evidence of intraventricular hemorrhage or posterior fossa hemorrhage. 2. No evidence of other acute intracranial disease elsewhere. Electronically Signed: Sid Haywood  6/14/2023 2:47 PM EDT  Workstation ID: [id number]    CT Angiogram Head w AI Analysis of LVO    Result Date: 6/14/2023  CT ANGIOGRAM HEAD W AI ANALYSIS OF LVO, CT ANGIOGRAM NECK Date of Exam: 6/14/2023 2:37 PM EDT Indication: Neuro deficit, acute stroke suspected Neuro deficit, acute stroke suspected. Comparison: Noncontrast head CT from the same date Technique: CTA of the head was performed after the uneventful intravenous administration of 75 mL Isovue-370. Reconstructed coronal and sagittal images were also obtained. In addition, a 3-D volume rendered image was created for interpretation. Automated  exposure control and iterative reconstruction methods were used. FINDINGS: Vascular Findings: Atherosclerotic plaque is seen within the right carotid bifurcation and right carotid siphon. The right common carotid, internal carotid, middle cerebral, anterior cerebral, vertebral, and posterior cerebral arteries are patent without abrupt cut off or aneurysmal dilation. There is fetal origin of the right posterior cerebral artery.  Atherosclerotic plaque is seen within the left carotid bifurcation and left carotid siphon. Estimated 30 to 40% stenosis within the left proximal ICA. The left common carotid, remainder of the left ICA, middle cerebral, anterior cerebral, vertebral, and posterior cerebral arteries are patent without abrupt cut off. 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 There is focal moderate stenosis within the left vertebral artery at the C1-C2 level. Basilar artery appears patent and appears unremarkable. Non-vascular Findings: Intraparenchymal hematoma centered within the left thalamus/internal capsule measuring approximately 3 x 1.7 cm. Findings compatible with chronic microvascular ischemic change and diffuse cortical atrophy. Please refer to the noncontrast head CT from today for additional details. No acute abnormality is identified within the visualized soft tissue or bony structures of the neck. The visualized lung apices are clear.     Impression: 1.Intraparenchymal hematoma centered within the left thalamus/internal capsule measuring approximately 3 x 1.7 cm. Please refer to the noncontrast head CT from today for additional details. 2.No acute abnormality of the large vessels of the head or neck. 3.3 mm aneurysm or P-comm infundibulum projects posteriorly from the left supraclinoid ICA (series 9, image 277). 4.Atherosclerotic plaque within the bilateral carotid bifurcations and bilateral carotid siphons. Estimated 30 to 40% stenosis within the left proximal ICA. 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 Mancia is a 89 yo WF with PMH of essential hypertension, hyperlipidemia, atrial fibrillation (on chronic Eliquis), diabetes mellitus type 2, dementia, and provoked PE (s/p hip replacement) who presented with acute onset of aphasia and R sided weakness. Pt was found to have a hemorrhagic CVA and family has decided to pursue comfort measures.    Plan:    Acute ICH  -- stop Eliquis  -- no intervention per NSGY   -- family would like comfort measures, consult Inpatient Hospice team in am  -- defer any other medications other than those for comfort    Leukocytosis    TCP    Total time spent: 40 minutes  Time spent includes time reviewing chart, face-to-face time, counseling patient/family/caregiver, ordering medications/tests/procedures, communicating with other health care professionals, documenting clinical information in the electronic health record, and coordination of care.      DVT prophylaxis:  Mechanical due to above      CODE STATUS:    Code Status and Medical Interventions:   Ordered at: 06/14/23 1646     Level Of Support Discussed With:    Health Care Surrogate     Code Status (Patient has no pulse and is not breathing):    No CPR (Do Not Attempt to Resuscitate)     Medical Interventions (Patient has pulse or is breathing):    Comfort Measures       Expected Discharge   Expected Discharge Date: 6/15/2023; Expected Discharge Time:      Avril Patton MD  06/14/23    Electronically signed by Avril Patton MD at 06/14/23 1701          Emergency Department Notes        Akhil Daniels MD at 06/14/23 1433        Procedure Orders    1. Critical Care [[id number]] ordered by Jeremiah  Akhil Begum MD                 Subjective   History of Present Illness  Patient is a 89-year-old female presenting from home via EMS secondary to alteration in mental status and concern for stroke process.  Patient was last seen around 1:30 PM when caregiver left.  Family returned and found significant change in mental status with right-sided deficit.  EMS reports flaccid right side with facial asymmetry.  Patient is on chronic anticoagulation, Eliquis, secondary to atrial fibrillation as well as a history of dementia, diabetes, PE, hyperlipidemia, hypertension.    History provided by:  EMS personnel  History limited by:  Patient nonverbal    Review of Systems    Past Medical History:   Diagnosis Date    Arthritis     Atrial fibrillation     Dementia     Diabetes mellitus     dx: approx 3 yrs ago, checks bs twice weekly     Full dentures     will remove before surgery    Gout     History of pulmonary embolus (PE)     post hip replacement    Hx of bladder infections     Hyperlipidemia     Hypertension     Low back pain     Osteoporosis     UTI (urinary tract infection)     Wears glasses        Allergies   Allergen Reactions    Aspirin GI Intolerance       Past Surgical History:   Procedure Laterality Date    APPENDECTOMY      CATARACT EXTRACTION Bilateral     CERVICAL FUSION  1980's    KYPHOPLASTY N/A 3/29/2021    Procedure: KYPHOPLASTY L4;  Surgeon: Mars Pyle MD;  Location: [facility];  Service: Neurosurgery;  Laterality: N/A;    LAPAROSCOPIC TUBAL LIGATION      LUMBAR FUSION  1970's    Dr. Corley    LUMBAR LAMINECTOMY  2013    L3/4 and L4/5; Dr. Mars Pyle    OTHER SURGICAL HISTORY Right     bone spur removal - foot       Family History   Problem Relation Age of Onset    Arthritis Mother     Diabetes Mother     Hypertension Mother     Heart disease Father     Hypertension Father     Heart disease Brother     Tuberculosis Brother        Social History     Socioeconomic History    Marital status:     Tobacco Use    Smoking status: Never    Smokeless tobacco: Never   Vaping Use    Vaping Use: Never used   Substance and Sexual Activity    Alcohol use: Never    Drug use: Never    Sexual activity: Defer           Objective   Physical Exam  Vitals and nursing note reviewed.   Constitutional:       Appearance: She is ill-appearing.   Cardiovascular:      Pulses: Normal pulses.   Pulmonary:      Effort: No respiratory distress.   Skin:     General: Skin is warm and dry.   Neurological:      Comments: Significant right-sided deficit.  Gaze deviation to the left.  Facial asymmetry on the right.  No movement of the right arm.  Some movement to painful stimuli of the right leg.  Patient nonverbal.  Limited exam secondary to alteration mental status and nonverbal status.  However, NIH stroke scale approximately ~26.       Critical Care  Performed by: Akhil Daniels MD  Authorized by: Akhil Daniels MD     Critical care provider statement:     Critical care time (minutes):  45    Critical care was necessary to treat or prevent imminent or life-threatening deterioration of the following conditions:  CNS failure or compromise    Critical care was time spent personally by me on the following activities:  Development of treatment plan with patient or surrogate, discussions with consultants, examination of patient, obtaining history from patient or surrogate, ordering and performing treatments and interventions, ordering and review of laboratory studies and ordering and review of radiographic studies    Care discussed with: admitting provider            ED Course  ED Course as of 06/14/23 1855   Wed Jun 14, 2023   4915 I evaluated the patient in the CT scanner with the stroke navigator.  Significant right-sided deficit.  Left thalamic/basal ganglia hemorrhage noted.  Reviewed the CT scanner with the radiologist and personally reviewed the images.  The stroke navigator reviewed it with Dr. Del Toro with  neurosurgery/stroke intervention, he advises against reversal of the patient's factor Xa inhibitor.  All further as per the stroke team. [RS]   1450 CT Head Without Contrast Stroke Protocol  Personally reviewed the images contemporaneously in the CT suite demonstrating the left sided hemorrhage.  See report for details. [RS]   1451 Intracerebral Hemorrhage (ICH) Score - MDCalc  Calculated on Jun 14 2023 3:03 PM  2 points -> 26% mortality. Please note that the ICH score is primarily used as a clinical grading scale and communication tool.  It is not meant to provide prognostic information, and should not be used as a primary means to predict the outcomes of patients with ICH. [RS]   1514 I talked with the family who is now at bedside.  We had a lengthy discussion on the relatively poor prognosis for this patient with high mortality but even more significant long-term disability if she survives.  They do not want any heroic measures and desires a DNR/DNI supportive measures status.  I have notified the stroke navigator.  They advised the patient to be admitted to the telemetry floor. [RS]      ED Course User Index  [RS] Akhil Daniels MD                                           Medical Decision Making  Problems Addressed:  Chronic anticoagulation: chronic illness or injury  Chronic atrial fibrillation: chronic illness or injury  DNR (do not resuscitate): complicated acute illness or injury  Elevated blood pressure reading with diagnosis of hypertension: complicated acute illness or injury  Nontraumatic subcortical hemorrhage of left cerebral hemisphere: complicated acute illness or injury    Amount and/or Complexity of Data Reviewed  Independent Historian: caregiver and EMS  Labs: ordered.  Radiology: ordered. Decision-making details documented in ED Course.  ECG/medicine tests: ordered.    Risk  Prescription drug management.  Decision regarding hospitalization.  Decision not to resuscitate or to de-escalate  care because of poor prognosis.    Critical Care  Total time providing critical care: 45 minutes      Final diagnoses:   Nontraumatic subcortical hemorrhage of left cerebral hemisphere   Chronic anticoagulation   Chronic atrial fibrillation   Elevated blood pressure reading with diagnosis of hypertension   DNR (do not resuscitate)       ED Disposition  ED Disposition       ED Disposition   Decision to Admit    Condition   --    Comment   Level of Care: Telemetry [5]   Diagnosis: Nontraumatic subcortical hemorrhage of left cerebral hemisphere [[id number]]   Admitting Physician: ROXY FERRER [[id number]]   Attending Physician: ROXY FERRER [[id number]]   Certification: I Certify That Inpatient Hospital Services Are Medically Necessary For Greater Than 2 Midnights                 No follow-up provider specified.       Medication List      No changes were made to your prescriptions during this visit.            Akhil Daniels MD  06/14/23 1855      Electronically signed by Akhil Daniels MD at 06/14/23 1855       Vital Signs (last day)       Date/Time Temp Temp src Pulse Resp BP Patient Position SpO2    06/15/23 0524 97.4 (36.3) Axillary 97 16 150/102 Lying 99    06/14/23 1914 97.6 (36.4) Oral 97 16 141/80 Lying 100    06/14/23 1743 97.5 (36.4) Axillary 90 16 146/78 Lying 100    06/14/23 1630 -- -- 93 -- 121/74 -- 99    06/14/23 1600 -- -- 91 -- 125/80 -- 100    06/14/23 1530 -- -- 93 -- 129/80 -- 100    06/14/23 1514 97.3 (36.3) Axillary -- -- -- -- --    06/14/23 1508 -- -- 90 -- 129/81 -- --    06/14/23 1500 -- -- 93 -- 136/80 -- 98    06/14/23 1453 -- -- 90 -- -- -- 98    06/14/23 1452 -- -- -- -- 149/82 -- --    06/14/23 1439 -- -- 98 14 142/88 Lying 98          Oxygen Therapy (last day)       Date/Time SpO2 Device (Oxygen Therapy) Flow (L/min) Oxygen Concentration (%) ETCO2 (mmHg)    06/15/23 1000 -- room air -- -- --    06/15/23 0800 -- room air -- -- --    06/15/23 0608 -- room air -- -- --     06/15/23 0524 99 -- -- -- --    06/15/23 0409 -- room air -- -- --    06/15/23 0156 -- room air -- -- --    06/15/23 0005 -- room air -- -- --    06/14/23 2037 -- room air -- -- --    06/14/23 1914 100 -- -- -- --    06/14/23 1800 -- room air -- -- --    06/14/23 1745 -- room air -- -- --    06/14/23 1743 100 room air -- -- --    06/14/23 1630 99 -- -- -- --    06/14/23 1600 100 -- -- -- --    06/14/23 1530 100 -- -- -- --    06/14/23 1500 98 -- -- -- --    06/14/23 1453 98 -- -- -- --    06/14/23 1439 98 -- -- -- --          Lines, Drains & Airways       Active LDAs       Name Placement date Placement time Site Days    Peripheral IV 06/14/23 Left Antecubital 06/14/23  --  Antecubital  1    Peripheral IV 06/14/23 1945 Right Antecubital 06/14/23 1945  unknown  Antecubital  less than 1                  Current Facility-Administered Medications   Medication Dose Route Frequency Provider Last Rate Last Admin    acetaminophen (TYLENOL) tablet 650 mg  650 mg Oral Q4H PRN Avril Patton MD        Or    acetaminophen (TYLENOL) 160 MG/5ML solution 650 mg  650 mg Oral Q4H PRN Avril Patton MD        Or    acetaminophen (TYLENOL) suppository 650 mg  650 mg Rectal Q4H PRN Avril Patton MD        sennosides-docusate (PERICOLACE) 8.6-50 MG per tablet 2 tablet  2 tablet Oral BID Avril Patton MD        And    polyethylene glycol (MIRALAX) packet 17 g  17 g Oral Daily PRN Avirl Patton MD        And    bisacodyl (DULCOLAX) EC tablet 5 mg  5 mg Oral Daily PRN Avril Patton MD        And    bisacodyl (DULCOLAX) suppository 10 mg  10 mg Rectal Daily PRN Avril Patton MD        morphine injection 1 mg  1 mg Intravenous Q4H PRN Avril Patton MD   1 mg at 06/15/23 0647    And    naloxone (NARCAN) injection 0.4 mg  0.4 mg Intravenous Q5 Min PRN Avril Patton MD        ondansetron (ZOFRAN) tablet 4 mg  4 mg Oral Q6H PRN Avril Patton MD        Or     ondansetron (ZOFRAN) injection 4 mg  4 mg Intravenous Q6H PRN Avril Patton MD   4 mg at 06/15/23 0227    sodium chloride 0.9 % flush 1-10 mL  1-10 mL Intravenous PRN Avril Patton MD        sodium chloride 0.9 % flush 10 mL  10 mL Intravenous PRN Akhil Daniels MD        sodium chloride 0.9 % flush 10 mL  10 mL Intravenous Q12H vAril Patton MD   10 mL at 06/15/23 0844    sodium chloride 0.9 % infusion 40 mL  40 mL Intravenous PRN Avril Patton MD        sodium chloride 0.9 % infusion  100 mL/hr Intravenous Once PRN Avril Patton MD         Lab Results (last 24 hours)       Procedure Component Value Units Date/Time    Alleene Draw [[id number]] Collected: 06/14/23 1445    Specimen: Blood Updated: 06/14/23 1846    Narrative:      The following orders were created for panel order Alleene Draw.  Procedure                               Abnormality         Status                     ---------                               -----------         ------                     Green Top (Gel)[[id number]]                                  Final result               Lavender Top[[id number]]                                     Final result               Gold Top - SST[[id number]]                                   Final result               Gray Top[[id number]]                                         Final result               Light Blue Top[[id number]]                                   Final result                 Please view results for these tests on the individual orders.    Garcia Top [[id number]] Collected: 06/14/23 1445    Specimen: Blood Updated: 06/14/23 1846     Extra Tube Hold for add-ons.     Comment: Auto resulted.       POC Creatinine [[id number]]  (Normal) Collected: 06/14/23 1441    Specimen: Blood Updated: 06/14/23 1833     Creatinine 0.80 mg/dL      Comment: Serial Number: [id number]:  [id number]       High Sensitivity Troponin T 2Hr [[id number]]  (Abnormal) Collected:  06/14/23 1653    Specimen: Blood from Arm, Right Updated: 06/14/23 1725     HS Troponin T 81 ng/L      Troponin T Delta -2 ng/L     Narrative:      High Sensitive Troponin T Reference Range:  <10.0 ng/L- Negative Female for AMI  <15.0 ng/L- Negative Male for AMI  >=10 - Abnormal Female indicating possible myocardial injury.  >=15 - Abnormal Male indicating possible myocardial injury.   Clinicians would have to utilize clinical acumen, EKG, Troponin, and serial changes to determine if it is an Acute Myocardial Infarction or myocardial injury due to an underlying chronic condition.         Green Top (Gel) [[id number]] Collected: 06/14/23 1445    Specimen: Blood Updated: 06/14/23 1546     Extra Tube Hold for add-ons.     Comment: Auto resulted.       Lavender Top [[id number]] Collected: 06/14/23 1445    Specimen: Blood Updated: 06/14/23 1546     Extra Tube hold for add-on     Comment: Auto resulted       Gold Top - SST [[id number]] Collected: 06/14/23 1445    Specimen: Blood Updated: 06/14/23 1546     Extra Tube Hold for add-ons.     Comment: Auto resulted.       Light Blue Top [[id number]] Collected: 06/14/23 1445    Specimen: Blood Updated: 06/14/23 1546     Extra Tube Hold for add-ons.     Comment: Auto resulted       AST [[id number]]  (Abnormal) Collected: 06/14/23 1445    Specimen: Blood Updated: 06/14/23 1529     AST (SGOT) 54 U/L     ALT [[id number]]  (Abnormal) Collected: 06/14/23 1445    Specimen: Blood Updated: 06/14/23 1529     ALT (SGPT) 60 U/L     Single High Sensitivity Troponin T [[id number]]  (Abnormal) Collected: 06/14/23 1445    Specimen: Blood Updated: 06/14/23 1526     HS Troponin T 83 ng/L     Narrative:      High Sensitive Troponin T Reference Range:  <10.0 ng/L- Negative Female for AMI  <15.0 ng/L- Negative Male for AMI  >=10 - Abnormal Female indicating possible myocardial injury.  >=15 - Abnormal Male indicating possible myocardial injury.   Clinicians would have to utilize clinical acumen, EKG,  Troponin, and serial changes to determine if it is an Acute Myocardial Infarction or myocardial injury due to an underlying chronic condition.         aPTT [[id number]]  (Normal) Collected: 06/14/23 1445    Specimen: Blood Updated: 06/14/23 1506     PTT 29.7 seconds     Narrative:      PTT = The equivalent PTT values for the therapeutic range of heparin levels at 0.3 to 0.5 U/ml are 60 to 70 seconds.    POCT Protime/INR [[id number]]  (Abnormal) Resulted: 06/14/23 1456    Specimen: Blood Updated: 06/14/23 1457     Protime 18.9 seconds      INR 2    CBC & Differential [[id number]]  (Abnormal) Collected: 06/14/23 1445    Specimen: Blood Updated: 06/14/23 1448    Narrative:      The following orders were created for panel order CBC & Differential.  Procedure                               Abnormality         Status                     ---------                               -----------         ------                     CBC Auto Differential[[id number]]        Abnormal            Final result                 Please view results for these tests on the individual orders.    CBC Auto Differential [[id number]]  (Abnormal) Collected: 06/14/23 1445    Specimen: Blood Updated: 06/14/23 1448     WBC 12.70 10*3/mm3      RBC 3.70 10*6/mm3      Hemoglobin 12.6 g/dL      Hematocrit 38.5 %      .1 fL      MCH 34.1 pg      MCHC 32.7 g/dL      RDW 15.3 %      RDW-SD 58.4 fl      MPV 12.6 fL      Platelets 114 10*3/mm3      Neutrophil % 70.6 %      Lymphocyte % 20.6 %      Monocyte % 6.7 %      Eosinophil % 1.3 %      Basophil % 0.3 %      Immature Grans % 0.5 %      Neutrophils, Absolute 8.96 10*3/mm3      Lymphocytes, Absolute 2.62 10*3/mm3      Monocytes, Absolute 0.85 10*3/mm3      Eosinophils, Absolute 0.17 10*3/mm3      Basophils, Absolute 0.04 10*3/mm3      Immature Grans, Absolute 0.06 10*3/mm3      nRBC 0.2 /100 WBC     POC Protime / INR [[id number]]  (Abnormal) Collected: 06/14/23 1441    Specimen: Blood Updated: 06/14/23  1445     Protime 18.9 seconds      INR 1.6     Comment: Serial Number: [id number]:  [id number]             Imaging Results (Last 24 Hours)       Procedure Component Value Units Date/Time    XR Chest 1 View [[id number]] Collected: 06/14/23 1510     Updated: 06/14/23 1514    Narrative:      XR CHEST 1 VW    Date of Exam: 6/14/2023 2:51 PM EDT    Indication: Acute Stroke Protocol (onset < 12 hrs)    Comparison: No recent comparison studies. Chest radiograph 9/10/2008    Findings:  Patient is rotated to the left. Allowing for this the heart shadow is normal in size. There does appear to be a small area of discoid atelectasis in the left base but the lungs otherwise appear clear. No edema, effusion or pneumothorax is seen.   Appearance of scoliosis on this study is not present on the prior exam, and may be partly positional. Bony structures appear intact.      Impression:      Impression:  Mild left basilar discoid atelectasis.      Electronically Signed: Sid Haywood    6/14/2023 3:11 PM EDT    Workstation ID: [id number]    CT Angiogram Head w AI Analysis of LVO [[id number]] Collected: 06/14/23 1445     Updated: 06/14/23 1500    Narrative:      CT ANGIOGRAM HEAD W AI ANALYSIS OF LVO, CT ANGIOGRAM NECK    Date of Exam: 6/14/2023 2:37 PM EDT    Indication: Neuro deficit, acute stroke suspected  Neuro deficit, acute stroke suspected.    Comparison: Noncontrast head CT from the same date    Technique: CTA of the head was performed after the uneventful intravenous administration of 75 mL Isovue-370. Reconstructed coronal and sagittal images were also obtained. In addition, a 3-D volume rendered image was created for interpretation. Automated   exposure control and iterative reconstruction methods were used.    FINDINGS:    Vascular Findings:    Atherosclerotic plaque is seen within the right carotid bifurcation and right carotid siphon. The right common carotid, internal carotid, middle cerebral, anterior cerebral, vertebral, and  posterior cerebral arteries are patent without abrupt cut off or   aneurysmal dilation. There is fetal origin of the right posterior cerebral artery.    Atherosclerotic plaque is seen within the left carotid bifurcation and left carotid siphon. Estimated 30 to 40% stenosis within the left proximal ICA. The left common carotid, remainder of the left ICA, middle cerebral, anterior cerebral, vertebral, and   posterior cerebral arteries are patent without abrupt cut off. 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 There is focal moderate stenosis within the left vertebral artery at the C1-C2   level.    Basilar artery appears patent and appears unremarkable.    Non-vascular Findings:    Intraparenchymal hematoma centered within the left thalamus/internal capsule measuring approximately 3 x 1.7 cm. Findings compatible with chronic microvascular ischemic change and diffuse cortical atrophy. Please refer to the noncontrast head CT from   today for additional details.    No acute abnormality is identified within the visualized soft tissue or bony structures of the neck.    The visualized lung apices are clear.      Impression:      1.Intraparenchymal hematoma centered within the left thalamus/internal capsule measuring approximately 3 x 1.7 cm. Please refer to the noncontrast head CT from today for additional details.  2.No acute abnormality of the large vessels of the head or neck.  3.3 mm aneurysm or P-comm infundibulum projects posteriorly from the left supraclinoid ICA (series 9, image 277).   4.Atherosclerotic plaque within the bilateral carotid bifurcations and bilateral carotid siphons. Estimated 30 to 40% stenosis within the left proximal ICA.  5.Additional findings as detailed above    Electronically Signed: James Govea    6/14/2023 2:56 PM EDT    Workstation ID: [id number]    CT Angiogram Neck [[id number]] Collected: 06/14/23 6820     Updated:  06/14/23 1500    Narrative:      CT ANGIOGRAM HEAD W AI ANALYSIS OF LVO, CT ANGIOGRAM NECK    Date of Exam: 6/14/2023 2:37 PM EDT    Indication: Neuro deficit, acute stroke suspected  Neuro deficit, acute stroke suspected.    Comparison: Noncontrast head CT from the same date    Technique: CTA of the head was performed after the uneventful intravenous administration of 75 mL Isovue-370. Reconstructed coronal and sagittal images were also obtained. In addition, a 3-D volume rendered image was created for interpretation. Automated   exposure control and iterative reconstruction methods were used.    FINDINGS:    Vascular Findings:    Atherosclerotic plaque is seen within the right carotid bifurcation and right carotid siphon. The right common carotid, internal carotid, middle cerebral, anterior cerebral, vertebral, and posterior cerebral arteries are patent without abrupt cut off or   aneurysmal dilation. There is fetal origin of the right posterior cerebral artery.    Atherosclerotic plaque is seen within the left carotid bifurcation and left carotid siphon. Estimated 30 to 40% stenosis within the left proximal ICA. The left common carotid, remainder of the left ICA, middle cerebral, anterior cerebral, vertebral, and   posterior cerebral arteries are patent without abrupt cut off. 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 There is focal moderate stenosis within the left vertebral artery at the C1-C2   level.    Basilar artery appears patent and appears unremarkable.    Non-vascular Findings:    Intraparenchymal hematoma centered within the left thalamus/internal capsule measuring approximately 3 x 1.7 cm. Findings compatible with chronic microvascular ischemic change and diffuse cortical atrophy. Please refer to the noncontrast head CT from   today for additional details.    No acute abnormality is identified within the visualized soft tissue or  bony structures of the neck.    The visualized lung apices are clear.      Impression:      1.Intraparenchymal hematoma centered within the left thalamus/internal capsule measuring approximately 3 x 1.7 cm. Please refer to the noncontrast head CT from today for additional details.  2.No acute abnormality of the large vessels of the head or neck.  3.3 mm aneurysm or P-comm infundibulum projects posteriorly from the left supraclinoid ICA (series 9, image 277).   4.Atherosclerotic plaque within the bilateral carotid bifurcations and bilateral carotid siphons. Estimated 30 to 40% stenosis within the left proximal ICA.  5.Additional findings as detailed above    Electronically Signed: James Govea    6/14/2023 2:56 PM EDT    Workstation ID: [id number]    CT Head Without Contrast Stroke Protocol [[id number]] Collected: 06/14/23 1442     Updated: 06/14/23 1450    Narrative:      CT HEAD WO CONTRAST STROKE PROTOCOL    Date of Exam: 6/14/2023 2:36 PM EDT    Indication: Neuro deficit, acute, stroke suspected  Neuro deficit, acute stroke suspected.    Comparison: None available.    Technique: Axial CT images were obtained of the head without contrast administration.  Reconstructed coronal images were also obtained. Automated exposure control and iterative construction methods were used.    Scan Time: 1430  Results discussed with Dr. Daniels at 1433, 6/14/2023.      Findings:  There is an acute parenchymal hemorrhage centered in the left basal ganglia and left thalamus, 25 x 16 mm in axial dimensions, with calculated volume of 3.0 mL by ABC/2 method. There is no evidence of intraventricular hemorrhage, posterior fossa   hemorrhage, or other hemorrhage elsewhere.    Remainder the scan shows advanced generalized cerebral atrophy and extensive chronic appearing central white matter changes. There is no evidence of mass or significant mass effect or abnormal extra-axial collection.    Bone window images show the calvarium to appear  intact. Included portions of the paranasal sinuses and mastoids appear grossly normal. There is evidence of previous corneal surgery. Orbits otherwise appear unremarkable.      Impression:      Impression:    1. Acute parenchymal hemorrhage in the left basal ganglia and left thalamus, with estimated volume of 3.0 mL. No evidence of intraventricular hemorrhage or posterior fossa hemorrhage.    2. No evidence of other acute intracranial disease elsewhere.        Electronically Signed: Sdi Egand    6/14/2023 2:47 PM EDT    Workstation ID: [id number]          ECG/EMG Results (last 24 hours)       Procedure Component Value Units Date/Time    ECG 12 Lead ED Triage Standing Order; Acute Stroke (Onset <24 hrs) [[id number]] Collected: 06/14/23 1452     Updated: 06/14/23 1513     QT Interval 346 ms      QTC Interval 425 ms     Narrative:      Test Reason : ED Triage Standing Order~  Blood Pressure :   */*   mmHG  Vent. Rate :  91 BPM     Atrial Rate :  92 BPM     P-R Int :   * ms          QRS Dur :  84 ms      QT Int : 346 ms       P-R-T Axes :   * -22 178 degrees     QTc Int : 425 ms    Atrial fibrillation  Moderate voltage criteria for LVH, may be normal variant  Inferior infarct , age undetermined  Anteroseptal infarct (cited on or before 23-MAY-2013)  ST & T wave abnormality, consider lateral ischemia  Abnormal ECG  When compared with ECG of 23-MAY-2013 16:06,  Significant changes have occurred    Referred By: TIERNEY           Confirmed By:              Physician Progress Notes (last 24 hours)        Jonathan Del Toro MD at 06/14/23 1451          Asked to review the head CT of this 89-year-old woman who presented with a sudden onset of mutism and right hemiplegia.  She has a small basal ganglia hemorrhage on the left side with no intraventricular extension.  There is a small amount of perilesional edema.  She is on Eliquis.    The literature is not really clear on what to do in a circumstance like this, however is  certain that the available medicines we have on formulary which include FFP and Kcentra are not effective rapidly reversing Eliquis.  Given her age and her history of cognitive decline, I certainly would not recommend using Kcentra in the setting since it is expensive and does not really work in the setting of Eliquis anyway.    Again given her age, the location of the hemorrhage, and her baseline level of functioning, I suspect that this is probably going to be a terminal event for her.  There is certainly no role for neurosurgical intervention.  My recommendation is for blood pressure control.    Given the spontaneous nature of this hemorrhage I think she probably needs to come off of her anticoagulants permanently with the understanding that this will increase her risk of ischemic stroke.    Electronically signed by Jonathan Del Toro MD at 23 1453          Consult Notes (last 24 hours)        Kim Araujo MD at 06/15/23 1129        Consult Orders    1. Inpatient Palliative Care MD Consult [[id number]] ordered by Avril Patton MD at 23 1601                 Met with patient and inpatient hospice team at patient's bedside, plan for admission to inpatient hospice. Patient appeared comfortable, no family at bedside. Will cancel palliative consult.    Kim Araujo MD     Electronically signed by Kim Araujo MD at 06/15/23 1131       Melida Hull APRN at 23 1442        Consult Orders    1. Inpatient Neurology Consult Stroke [[id number]] ordered by Akhil Daniels MD at 23 1434              Attestation signed by Bulmaro Chilel MD at 06/15/23 1125    I have reviewed this documentation and agree.                  Stroke Consult Note    Patient Name: Justine Mancia   MRN: [id number]  Age: 89 y.o.  Sex: female  : 3/24/1933    Primary Care Physician: Dino Godinez PA-C  Referring Physician:  Dr. Akhil Daniels    TIME STROKE TEAM  CALLED: 1422 EST     TIME PATIENT SEEN: 1426 EST    Handedness: Unknown  Race:      Chief Complaint/Reason for Consultation: Right-sided weakness and mute    HPI: Mrs. Mancia is a 89 year old female with known medical diagnoses of essential hypertension, hyperlipidemia, atrial fibrillation (on chronic Eliquis), diabetes mellitus type 2, dementia, and provoked PE (s/p hip replacement) who presents to [facility] via EMS after daughter found her with right-sided weakness and aphasia.  Per EMS report the patient has a caregiver who left the patient at approximately 1330 in her normal state of health.  The family's returned and noted that the patient was unable to move her right side or speak prompting them to contact EMS who brought her to our facility for further evaluation.    EMS reports that the patient does take Eliquis daily and family reports compliance with this medication.  Medical history was obtained from the patient's EMR as there is currently no family present.    Last Known Normal Date/Time: 1330 EST     Review of Systems   Unable to perform ROS: Patient nonverbal      Past Medical History:   Diagnosis Date    Arthritis     Atrial fibrillation     Dementia     Diabetes mellitus     dx: approx 3 yrs ago, checks bs twice weekly     Full dentures     will remove before surgery    Gout     History of pulmonary embolus (PE)     post hip replacement    Hx of bladder infections     Hyperlipidemia     Hypertension     Low back pain     Osteoporosis     UTI (urinary tract infection)     Wears glasses      Past Surgical History:   Procedure Laterality Date    APPENDECTOMY      CATARACT EXTRACTION Bilateral     CERVICAL FUSION  1980's    KYPHOPLASTY N/A 3/29/2021    Procedure: KYPHOPLASTY L4;  Surgeon: Mars Pyle MD;  Location: [facility];  Service: Neurosurgery;  Laterality: N/A;    LAPAROSCOPIC TUBAL LIGATION      LUMBAR FUSION  1970's    Dr. Corley    LUMBAR LAMINECTOMY  2013    L3/4 and  L4/5; Dr. Mars Pyle    OTHER SURGICAL HISTORY Right     bone spur removal - foot     Family History   Problem Relation Age of Onset    Arthritis Mother     Diabetes Mother     Hypertension Mother     Heart disease Father     Hypertension Father     Heart disease Brother     Tuberculosis Brother      Social History     Socioeconomic History    Marital status:    Tobacco Use    Smoking status: Never    Smokeless tobacco: Never   Vaping Use    Vaping Use: Never used   Substance and Sexual Activity    Alcohol use: Never    Drug use: Never    Sexual activity: Defer     Allergies   Allergen Reactions    Aspirin GI Intolerance     Prior to Admission medications    Medication Sig Start Date End Date Taking? Authorizing Provider   allopurinol (ZYLOPRIM) 100 MG tablet Take 100 mg by mouth 2 (Two) Times a Day. 2/4/21   Emilio Landis MD   atorvastatin (LIPITOR) 40 MG tablet Take 40 mg by mouth Every Night.    Emilio Landis MD   Calcium Carbonate-Vitamin D (CALTRATE 600+D PO) Take 1 tablet by mouth Daily. 800 IU VIT D3    Emilio Landis MD   Eliquis 5 MG tablet tablet  2/3/23   Emilio Landis MD   Gel Base gel 2 g 4 (Four) Times a Day. prilocaine 2%, lidocaine 10%, cyclobenzaprine 4%, capsaicin 0.001% and mannitol 20% 2/7/23   William Peguero MD   glipizide (GLUCOTROL) 5 MG tablet Take 5 mg by mouth Daily. 2/4/21   Emilio Landis MD   losartan (COZAAR) 50 MG tablet Take 50 mg by mouth Daily. 10/10/22   Emilio Landis MD   memantine (NAMENDA) 10 MG tablet Take 10 mg by mouth Daily. 2/4/21   Emilio Landis MD   metFORMIN (GLUCOPHAGE) 500 MG tablet Take 500 mg by mouth 2 (Two) Times a Day With Meals. 11/11/22   Emilio Landis MD   metoprolol succinate XL (TOPROL-XL) 50 MG 24 hr tablet Take 25 mg by mouth 2 (two) times a day. Cut in half    Emilio Landis MD   nitrofurantoin (MACRODANTIN) 100 MG capsule Take 100 mg by mouth Daily. Maintenance for frequent  uti 2/4/21   Provider, MD Emilio   potassium chloride (MICRO-K) 10 MEQ CR capsule Take 10 mEq by mouth Daily.    Provider, MD Emilio   predniSONE (DELTASONE) 10 MG tablet Take 10 mg by mouth Daily. 2/4/21   ProviderEmilio MD   traMADol (ULTRAM) 50 MG tablet Take 0.5 tablets by mouth 2 (Two) Times a Day As Needed for Moderate Pain or Severe Pain. 1/10/23   Paresh Ortega PA-C         Heart Rate:  [98] 98  Resp:  [14] 14  BP: (142)/(88) 142/88  Neurological Exam  Mental Status  Alert. Orientation: Unable to assess secondary to aphasia. Patient is nonverbal. Expressive aphasia and receptive aphasia present.  Patient occasionally will follow one-step commands.    Cranial Nerves  CN II: Vision test: Partial left gaze palsy  Complete right homonymous hemianopia. Right homonymous hemianopsia.  CN III, IV, VI: Pupils equal round and reactive to light bilaterally. Partial left gaze palsy.  CN V: Difficult to determine secondary to patient's aphasia.  CN VII:  Right: There is peripheral facial weakness.  CN VIII: Hearing appears to be intact bilaterally.    Motor  Decreased muscle bulk throughout. No fasciculations present. Decreased muscle tone.  Left upper extremity with 4+/5 strength and left lower extremity in 2/5 strength   Right upper extremity with 1/5 strength and right lower extremity with 2/5 strength.    Sensory  Light touch abnormality: Right upper and lower extremity.     Coordination    No obvious dysmetria.    Gait    No observed.    Physical Exam  Vitals reviewed.   Constitutional:       General: She is not in acute distress.     Appearance: She is ill-appearing.   HENT:      Head: Normocephalic and atraumatic.      Mouth/Throat:      Mouth: Mucous membranes are dry.   Eyes:      Pupils: Pupils are equal, round, and reactive to light.      Comments: Partial left gaze palsy  Complete right homonymous hemianopia   Cardiovascular:      Rate and Rhythm: Normal rate. Rhythm irregular.   Pulmonary:       Effort: Pulmonary effort is normal. No respiratory distress.      Comments: On room air  Skin:     General: Skin is warm and dry.   Neurological:      Mental Status: She is alert.      Cranial Nerves: Cranial nerve deficit present.      Sensory: Sensory deficit present.      Motor: Weakness present.   Psychiatric:         Attention and Perception: She is inattentive.         Mood and Affect: Affect is flat.         Speech: She is noncommunicative.         Behavior: Behavior is slowed. Behavior is cooperative.         Cognition and Memory: Cognition is impaired. Memory is impaired.       Acute Stroke Data    Thrombolytic Inclusion / Exclusion Criteria    Time: 14:44 EDT  Person Administering Scale: PEG Costello    Inclusion Criteria  [x]   18 years of age or greater   []   Onset of symptoms < 4.5 hours before beginning treatment (stroke onset = time patient was last seen well or without symptoms).   []   Diagnosis of acute ischemic stroke causing measurable disabling deficit (Complete Hemianopia, Any Aphasia, Visual or Sensory Extinction, Any weakness limiting sustained effort against gravity)   []   Any remaining deficit considered potentially disabling in view of patient and practitioner   Exclusion criteria (Do not proceed with Alteplase if any are checked under exclusion criteria)  []   Onset unknown or GREATER than 4.5 hours   [x]   ICH on CT/MRI   []   CT demonstrates hypodensity representing acute or subacute infarct   []   Significant head trauma or prior stroke in the previous 3 months   []   Symptoms suggestive of subarachnoid hemorrhage   []   History of un-ruptured intracranial aneurysm GREATER than 10 mm   []   Recent intracranial or intraspinal surgery within the last 3 months   []   Arterial puncture at a non-compressible site in the previous 7 days   []   Active internal bleeding   []   Acute bleeding tendency   []   Platelet count LESS than 100,000 for known hematological  diseases such as leukemia, thrombocytopenia or chronic cirrhosis   []   Current use of anticoagulant with INR GREATER than 1.7 or PT GREATER than 15 seconds, aPTT GREATER than 40 seconds   []   Heparin received within 48 hours, resulting in abnormally elevated aPTT GREATER than upper limit of normal   []   Current use of direct thrombin inhibitors or direct factor Xa inhibitors in the past 48 hours   []   Elevated blood pressure refractory to treatment (systolic GREATER than 185 mm/Hg or diastolic  GREATER than 110 mm/Hg   []   Suspected infective endocarditis and aortic arch dissection   []   Current use of therapeutic treatment dose of low-molecular-weight heparin (LMWH) within the previous 24 hours   []   Structural GI malignancy or bleed   Relative exclusion for all patients  []   Only minor non-disabling symptoms   []   Pregnancy   []   Seizure at onset with postictal residual neurological impairments   []   Major surgery or previous trauma within past 14 days   []   History of previous spontaneous ICH, intracranial neoplasm, or AV malformation   []   Postpartum (within previous 14 days)   []   Recent GI or urinary tract hemorrhage (within previous 21 days)   []   Recent acute MI (within previous 3 months)   []   History of un-ruptured intracranial aneurysm LESS than 10 mm   []   History of ruptured intracranial aneurysm   []   Blood glucose LESS than 50 mg/dL (2.7 mmol/L)   []   Dural puncture within the last 7 days   []   Known GREATER than 10 cerebral microbleeds   Additional exclusions for patients with symptoms onset between 3 and 4.5 hours.  [x]   Age > 80.   []   On any anticoagulants regardless of INR  >>> Warfarin (Coumadin), Heparin, Enoxaparin (Lovenox), fondaparinux (Arixtra), bivalirudin (Angiomax), Argatroban, dabigatran (Pradaxa), rivaroxaban (Xarelto), or apixaban (Eliquis)   []   Severe stroke (NIHSS > 25).   []   History of BOTH diabetes and previous ischemic stroke.   []   The risks and  benefits have been discussed with the patient or family related to the administration of IV thrombolytic therapy for stroke symptoms.   []   I have discussed and reviewed the patient's case and imaging with the attending prior to IV thrombolytic therapy.   N/A  Time IV thrombolytic administered       Hospital Meds:  Scheduled- prothrombin complex conc human, 50 Units/kg, Intravenous, Once      Infusions- niCARdipine, 5-15 mg/hr       PRNs-   sodium chloride    Functional Status Prior to Current Stroke/Geraldo Score: 5    NIH Stroke Scale  Time: 14:44 EDT  Person Administering Scale: PEG Costello  Interval: baseline  1a. Level of Consciousness: 0-->Alert, keenly responsive  1b. LOC Questions: 2-->Answers neither question correctly  1c. LOC Commands: 1-->Performs one task correctly  2. Best Gaze: 1-->Partial gaze palsy, gaze is abnormal in one or both eyes, but forced deviation or total gaze paresis is not present  3. Visual: 2-->Complete hemianopia  4. Facial Palsy: 2-->Partial paralysis (total or near-total paralysis of lower face)  5a. Motor Arm, Left: 0-->No drift, limb holds 90 (or 45) degrees for full 10 secs  5b. Motor Arm, Right: 3-->No effort against gravity, limb falls  6a. Motor Leg, Left: 3-->No effort against gravity, leg falls to bed immediately  6b. Motor Leg, Right: 3-->No effort against gravity, leg falls to bed immediately  7. Limb Ataxia: 0-->Absent  8. Sensory: 2-->Severe to total sensory loss, patient is not aware of being touched in the face, arm, and leg  9. Best Language: 3-->Mute, global aphasia, no usable speech or auditory comprehension  10. Dysarthria: 2-->Severe dysarthria, patients speech is so slurred as to be unintelligible in the absence of or out of proportion to any dysphasia, or is mute/anarthric  11. Extinction and Inattention (formerly Neglect): 2-->Profound abdelrahman-inattention/extinction more than 1 modality    Total (NIH Stroke Scale): 26    ICH Score:  1 Point (GCS  5 to 12)  0 Points (ICH volume < 30 cm3)  0 Points (Intraventricular Extension Absent)   0 Points (Infratentorial Origin - No )  1 Point (Age =/> 80 years)  The total ICS Score for this patient is 2 at 15:04 EDT on 06/14/23    Results Reviewed:  I have personally reviewed current lab, radiology, and data and agree with results.    CT head without contrast reveals hemorrhage within the left basal ganglia, mild amount of cerebral edema.    WBC 12.70  H/H 12.6/38.5  Platelets 114  AST 54  ALT 60    Assessment/Plan:    This is a 89 year old female with known medical diagnoses of essential hypertension, hyperlipidemia, atrial fibrillation (on chronic Eliquis), diabetes mellitus type 2, dementia, and provoked PE (s/p hip replacement) who presents to [facility] via EMS after daughter found her with right-sided weakness and aphasia.  On arrival to the emergency department the patient was taken for stat imaging and was found to have a left basal ganglia hemorrhage.  I discussed patient with Dr. Del Toro, neurosurgery, who confirms that the patient is not a surgical candidate and that he would not recommend reversing the patient's Eliquis as this would not affect the patient's overall outcome/prognosis.    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 Del Toro who does not recommend reversal of oral anticoagulation as this places patient at high risk for ischemic stroke and will not have any benefit towards the patient's overall outcome  -No role for surgical intervention  -N.p.o.  -Bedrest, fall precautions  -PT/OT/SLP; suspect patient will have severe dysarthria and will need further evaluation with FEES     Essential hypertension  -Strict blood  pressure management, SBP <140  -Nicardipine for SBP >140     Hyperlipidemia  -Lipid panel in a.m.  -Patient previously on atorvastatin 40 mg nightly, will hold given slightly elevated LFTs.    Atrial fibrillation, chronic  -Hold Eliquis  -Rate control per hospitalist    Diabetes mellitus type 2  -A1c in AM  -Management per hospitalist  -Maintain euglycemia, goal blood glucose <140     Dementia  -Continue Namenda 10 mg daily if patient is able to safely swallow    Plan of care was discussed with Dr. Del Toro and Dr. Daniels.  The patient will be admitted to the hospital service for further work-up and evaluation.  Stroke neurology will continue to follow.  Please call their with any questions or concerns.  Thank you for this consult.      Attempted to contact patient's daughter via telephone, left message for her to call back.  With increased age and severe neurological deficit suspect this will be a terminal event for the patient.  We will discuss possibility of palliative/hospice care with daughter when she is available.    Addendum 1545: Met with patient's family at bedside.  Scans and prognosis were discussed.  At this time family wishes to proceed with comfort measures only.  [facility] bed on [facility] fifth floor.  Inpatient palliative care consult ordered.  Stroke order set discontinued.    Stroke neurology will sign off for now.  Please call with any questions or concerns.    Melida Hull, PEG  June 14, 2023  14:44 EDT      Electronically signed by Bulmaro Chilel MD at 06/15/23 1044

## 2023-06-15 NOTE — CONSULTS
Met with patient and inpatient hospice team at patient's bedside, plan for admission to inpatient hospice. Patient appeared comfortable, no family at bedside. Will cancel palliative consult.    Kim Araujo MD

## 2023-06-15 NOTE — H&P
Hospice History and Physical     Patient Name:  Justine Mancia   : 3/24/1933   Sex: female    Patient Care Team:  Cindy Carmona MD as PCP - General (General Practice)  William Peguero MD as Consulting Physician (Pain Medicine)    Code Status: Comfort measures.     Subjective   89 [facility] and [facility]asia.  At baseline pt lives at home with paid caregivers assisting with ADL's.  LKW was ~ 1330 on 23 caregiver left pt, same afternoon family returned to find pt with right side weakness and aphasia.    PMH significant for: HTN, HLD, a fib (on Eliquis), T2DM, dementia, and provoked PE s/p hip replacement (2020).   Workup revealed left basal ganglia hemorrhage.  Neurosurgery was consulted but pt not a candidate for surgical intervention.  Family elected full comfort measures.  Inpatient hospice consulted.      Review of Systems  Review of Systems   Unable to perform ROS: Acuity of condition     History  Past Medical History:   Diagnosis Date    Arthritis     Atrial fibrillation     Dementia     Diabetes mellitus     dx: approx 3 yrs ago, checks bs twice weekly     Full dentures     will remove before surgery    Gout     History of pulmonary embolus (PE)     post hip replacement    Hx of bladder infections     Hyperlipidemia     Hypertension     Low back pain     Osteoporosis     UTI (urinary tract infection)     Wears glasses      Past Surgical History:   Procedure Laterality Date    APPENDECTOMY      CATARACT EXTRACTION Bilateral     CERVICAL FUSION      KYPHOPLASTY N/A 3/29/2021    Procedure: KYPHOPLASTY L4;  Surgeon: Mars Pyle MD;  Location: [facility];  Service: Neurosurgery;  Laterality: N/A;    LAPAROSCOPIC TUBAL LIGATION      LUMBAR FUSION      Dr. Corley    LUMBAR LAMINECTOMY  2013    L3/4 and L4/5; Dr. Mars Pyle    OTHER SURGICAL HISTORY Right     bone spur removal - foot     Current Facility-Administered Medications    Medication Dose Route Frequency Provider Last Rate Last Admin    acetaminophen (TYLENOL) tablet 650 mg  650 mg Oral Q4H PRN Lori Patton APRN        Or    acetaminophen (TYLENOL) 160 MG/5ML solution 650 mg  650 mg Oral Q4H PRN Lori Patton APRN        Or    acetaminophen (TYLENOL) suppository 650 mg  650 mg Rectal Q4H PRN Lori Patton APRN        bisacodyl (DULCOLAX) suppository 10 mg  10 mg Rectal Daily PRN Lori Patton APRN        furosemide (LASIX) injection 20 mg  20 mg Intravenous Q4H PRN Lori Patton APRN        glycopyrrolate (ROBINUL) injection 0.2 mg  0.2 mg Intravenous Q4H PRN Lori Patton APRN        haloperidol lactate (HALDOL) injection 1 mg  1 mg Intravenous Q4H PRN Lori Patton APRN        LORazepam (ATIVAN) injection 0.5 mg  0.5 mg Intravenous Q4H PRN Lori Patton APRN        morphine injection 1 mg  1 mg Intravenous Q1H PRN Lori Patton APRN        ondansetron (ZOFRAN) injection 4 mg  4 mg Intravenous Q6H PRN Lori Patton APRN        palliative care oral rinse   Mouth/Throat PRN Lori Patton APRN        polyvinyl alcohol (LIQUIFILM) 1.4 % ophthalmic solution 1 drop  1 drop Both Eyes Q30 Min PRN Lori Patton APRN        scopolamine patch 1 mg/72 hr  1 patch Transdermal Q72H PRN Lori Patton APRN        sodium chloride 0.9 % flush 1-10 mL  1-10 mL Intravenous PRN Lori Patton APRN              acetaminophen **OR** acetaminophen **OR** acetaminophen    bisacodyl    furosemide    glycopyrrolate    haloperidol lactate    LORazepam    Morphine    ondansetron    palliative care oral rinse    polyvinyl alcohol    Scopolamine    sodium chloride  Allergies   Allergen Reactions    Aspirin GI Intolerance     Family History   Problem Relation Age of Onset    Arthritis Mother     Diabetes Mother     Hypertension Mother     Heart disease Father     Hypertension Father     Heart disease Brother     Tuberculosis Brother      Social  History     Socioeconomic History    Marital status:    Tobacco Use    Smoking status: Never    Smokeless tobacco: Never   Vaping Use    Vaping Use: Never used   Substance and Sexual Activity    Alcohol use: Never    Drug use: Never    Sexual activity: Defer       Objective     Vital Signs  Temp:  [97.3 °F (36.3 °C)-97.6 °F (36.4 °C)] 97.4 °F (36.3 °C)  Heart Rate:  [90-98] 97  Resp:  [14-16] 16  BP: (121-150)/() 150/102    PPS: Palliative Performance Scale score as of 6/15/2023, 11:51 EDT is 10% based on the following measures:   Ambulation: Totally bed bound  Activity and Evidence of Disease: Unable to do any work, extensive evidence of disease  Self-Care: Total care  Intake:  Mouth care only  LOC: Drowsy or coma     Physical Exam:  Physical Exam  Vitals and nursing note reviewed. Exam conducted with a chaperone present.   Constitutional:       Appearance: She is ill-appearing.   Cardiovascular:      Rate and Rhythm: Tachycardia present.   Pulmonary:      Effort: Pulmonary effort is normal. No respiratory distress.      Breath sounds: No wheezing, rhonchi or rales.   Musculoskeletal:      Right lower leg: No edema.      Left lower leg: Edema present.   Skin:     General: Skin is cool.      Coloration: Skin is pale.      Comments: BLE cool to touch.    Neurological:      Mental Status: She is unresponsive.   Psychiatric:         Speech: She is noncommunicative.       Results Reviewed:  LAB RESULTS:      Lab 06/14/23  1456 06/14/23  1445 06/14/23  1441   WBC  --  12.70*  --    HEMOGLOBIN  --  12.6  --    HEMATOCRIT  --  38.5  --    PLATELETS  --  114*  --    NEUTROS ABS  --  8.96*  --    IMMATURE GRANS (ABS)  --  0.06*  --    LYMPHS ABS  --  2.62  --    MONOS ABS  --  0.85  --    EOS ABS  --  0.17  --    MCV  --  104.1*  --    PROTIME 18.9  --  18.9*   APTT  --  29.7  --          Lab 06/14/23  1441   CREATININE 0.80         Lab 06/14/23  1445   ALT (SGPT) 60*   AST (SGOT) 54*         Lab 06/14/23  8023  06/14/23  1456 06/14/23  1445 06/14/23  1441   HSTROP T 81*  --  83*  --    PROTIME  --  18.9  --  18.9*   INR  --  2*  --  1.6*                 Brief Urine Lab Results       None            Microbiology Results Abnormal       None              ICH (intracerebral hemorrhage)      Assessment & Plan     Assessment/Plan:   -Admit to inpatient hospice service 06/15/2023 with ICH (intracerebral hemorrhage) [I61.9].     -Coordination of care with nursing staff and BCN IDT.     -Pain: Morphine 1 mg IV q 1 hr PRN, for dose finding x 24 hours.      -Dyspnea:  Morphine as above, oxygen via NC PRN     -Nausea/Vomiting: Zofran 4 mg scheduled q 6 hr PRN       -Anxiety/Agitation: Lorazepam 0.5 mg q 4 hr PRN; haldol 1 mg q 4 hr PRN      -Bowel/bladder: bisacodyl supp q day PRN     -Nutrition: diet as tolerated     -ADLs: total care     -Terminal fever: tylenol supp 650 mg q 6 hr PRN. tordal 15 mg IV q 6 hr PRN     -Terminal secretions:  May start PRN robinul/scop patch/lasix if needed     -Patient comfort: palliative oral rinse PRN, liquifilm PRN     Goals of Care: achieve comfortable death, educate and support family through this process.         Total Visit Time: 50 min   Face to Face Time: 30 min    Justification for care:  Patient meets criteria for acute in-patient care with required nursing assessment and interventions for symptoms with IV medications.      KRISHNA FERRER, MSN, APRN  [facility] Nav[facility]ators  Hospice and Palliative Care Nurse Practitioner  06/15/23  11:51 EDT

## 2023-06-16 NOTE — PROGRESS NOTES
"Hospice Progress Note    Patient Name: Justine Mancia   : 3/24/1933  Gender: female    Code Status: comfort measures    Date of Admission: 6/15/2023    Subjective:  89 yoF admitted to inpatient hospice 6/15/23 for ICH.     Daughter and Son in Law at bedside.  Pt has been transferred to  today. Per daughters report she rested well overnight, ~ 30 min prior to assessment pt began to get restless.  Increased work of breathing.  RR > 42 at present.      - Scheduled:  None scheduled, dose finding x 24 hours.     - PRNs:  Robinul 0.2 x 1   Lorazepam 0.5 mg x 3   Morphine 1 mg x 8   Scopolamine patch x 1     - Intake/Output  Intake & Output (last 3 days)          07 07 0701  06/15 0700 06/15 0701   0700  0701   0700    Urine (mL/kg/hr)   1050     Total Output   1050     Net   -1050                        ROS:  Review of Systems   Unable to perform ROS: Acuity of condition     Reviewed current scheduled and prn medications for route, type, dose and frequency.       acetaminophen **OR** acetaminophen **OR** acetaminophen    bisacodyl    furosemide    glycopyrrolate    haloperidol lactate    LORazepam    Morphine    ondansetron    palliative care oral rinse    polyvinyl alcohol    Scopolamine    sodium chloride    Objective:   /71 (BP Location: Right arm, Patient Position: Lying)   Pulse 103   Temp 97.7 °F (36.5 °C) (Axillary)   Resp 16   Ht 162.6 cm (64.02\")   Wt 70.3 kg (154 lb 15.7 oz)   SpO2 99%   BMI 26.59 kg/m²      PPS: Palliative Performance Scale score as of 2023, 11:14 EDT is 10% based on the following measures:   Ambulation: Totally bed bound  Activity and Evidence of Disease: Unable to do any work, extensive evidence of disease  Self-Care: Total care  Intake:  Mouth care only  LOC: Drowsy or coma     Physical Exam:  Physical Exam  Vitals and nursing note reviewed.   Constitutional:       Appearance: She is ill-appearing and diaphoretic.   Cardiovascular: "      Rate and Rhythm: Tachycardia present.   Pulmonary:      Effort: Tachypnea present.      Breath sounds: Examination of the right-upper field reveals rales. Examination of the left-upper field reveals rales.   Abdominal:      General: Bowel sounds are normal. There is no distension.      Tenderness: There is no abdominal tenderness. There is no guarding.   Musculoskeletal:      Right lower leg: No edema.      Left lower leg: No edema.   Skin:     General: Skin is cool.      Coloration: Skin is mottled and pale.   Neurological:      Mental Status: She is unresponsive.         ICH (intracerebral hemorrhage)      Assessment/Plan:   Justine Mancia is a 89 y.o. female admitted to inpatient hospice 06/15/2023 with diagnosis of ICH (intracerebral hemorrhage) [I61.9]  for symptom management.     Symptoms:  Pain   Anxiety   Terminal secretions   Dyspnea     Discharge Disposition: [facility]    -Add scheduled Palliative oral rinse q 4 hrs for pt comfort.     -Add Morphine 2 mg q 6 hrs scheduled, continue morphine 2 mg q 1 hr PRN for BT pain and dyspnea.     -Add lorazepam 0.5 mg q 6 hrs scheduled, continue lorazepam 0.5 mg q 4 hrs PRN for anxiety and terminal restlessness.     Total Visit Time: 20 min   Face to Face Time: 10 min     Justification for care:  Patient meets criteria for acute in-patient care due to need for frequent skilled nursing assessments to determine patient comfort and medication effectiveness at end of life.  Frequent adjustments to medications and interventions for symptom management, including injectable medications.      Lori Patton, MSN, APRN  [facility] Care Navigators  Hospice and Palliative Care Nurse Practitioner  06/16/23  11:14 EDT

## 2023-06-16 NOTE — PROGRESS NOTES
Nutrition Services    Patient Name:  Justine Mancia  YOB: 1933  MRN: [id number]  Admit Date:  6/15/2023    Pt screened for MST reports. Noted to be in IP Hospice care, CMO. Will sign off, please consult Nutrition if needed for specific needs or if GOC should change, thank you.    Electronically signed by:  Kim Tyson RD  06/16/23 07:27 EDT

## 2023-06-16 NOTE — PROGRESS NOTES
Continued Stay Note  [facility]     Patient Name: Justine Mancia  MRN: [id number]  Today's Date: 6/16/2023    Admit Date: 6/15/2023    Plan: Inpatient hospice   Discharge Plan       Row Name 06/16/23 1139       Plan    Plan Inpatient hospice    Plan Comments DARYL 10% pps is a 89yo female with a terminal dx of ICH. Chart reviewed, visit to bedside, assess completed. Patient noted dyspneic, tachycardic, tachypneic, RR 42/min. Requested Jens MONTENEGRO [facility] medicate with prn ativan and morphine for symptom palliation. Done. LBM: unk. PRN's: morphine x 4, ativan x 2. Discussed assess, condition, goals, poc, medications, eol s/s, eol communication, what to possibly expect from this point forward with daughter and son in law at bedside. Offered ongoing support, open communication. Noted verbalization of understanding. Patient continues to require inpatient hospice services due to necessity of injectable medications for palliation of symptoms requiring frequent skilled nursing assessment and intervention. Current level of care is unable to be provided in an alternate setting. Care coordinated with Jens MONTENEGRO [facility]. Updated VDarin RUVALCABA.    Final Discharge Disposition Code 51 - hospice medical facility                   Discharge Codes    No documentation.                       Saray Montalvo RN

## 2023-06-16 NOTE — DISCHARGE SUMMARY
Date of Admission: 06/15/2023   Date and Time of Death: 6/16/2023 at 1:52 PM    Hospital Course:  Justine Mnacia is a 89 y.o. female admitted to inpatient hospice with diagnosis of ICH (intracerebral hemorrhage) [I61.9]  for symptom management. Medications were available throughout admission for symptom management and comfort. Patient continued to decline after admission as expected ultimately to their death on 6/16/2023 at 1:52 PM. Patient was pronounced dead by Clinical House Supervisor. Spiritual and psychosocial support were available to patient and family throughout admission. Patient's remains were released per [facility] protocol.     Lori Patton, MSN, APRN  [facility]ators  Hospice and Palliative Care Nurse Practitioner  06/16/23  15:18 EDT

## 2023-06-16 NOTE — SIGNIFICANT NOTE
Exam confirms with auscultation zero audible heart tones and zero audible respirations. Ms.Jewell KAITLIN Mancia was pronounced dead at 1352.  MD notified by Patient's RN.    Joanna Norton RN  Clinical House Supervisor  6/16/2023 14:46 EDT

## 2023-06-16 NOTE — PLAN OF CARE
Goal Outcome Evaluation:  Plan of Care Reviewed With: patient        Progress: declining            Patient transferred to  around 0400. PRNs administered for dyspnea and anxiety. Purewick in place. NPO. Room air. Waffle mattress in place. Q2H turns. Family at bedside. Withdraws from pain.

## 2023-06-17 NOTE — PAYOR COMM NOTE
"Justine Mitchell (Dcsd. Female)     [id number]     Millie Stephens RN  Utilization Review  Oizdy-[phone]  [phone]        Date of Birth   1933    Social Security Number       [place]   [address] [place] [address]    Home Phone   [phone]    MRN   [id number]       Sikhism   None    Marital Status                               Admission Date   23    Admission Type   Emergency    Admitting Provider   La Dominique MD    Attending Provider       Department, Room/Bed   [facility] 6A, N617/1       Discharge Date   6/15/2023    Discharge Disposition   Hospice/Medical Facility ([facility])    Discharge Destination                                 Attending Provider: (none)   Allergies: Aspirin    Isolation: None   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)   Code Status: Prior    Ht: 162.6 cm (64\")   Wt: 70.3 kg (155 lb)    Admission Cmt: None   Principal Problem: 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.0]                   Active Insurance as of 2023       Primary Coverage       Payor Plan Insurance Group Employer/Plan Group    ANTHEM MEDICARE REPLACEMENT ANTHEM MEDICARE ADVANTAGE [id number]       Payor Plan Address Payor Plan Phone Number Payor Plan Fax Number Effective Dates    [address] [phone]  2023 - None Entered    [facility] [address]         Subscriber Name Subscriber Birth Date Member ID       JUSTINE MITCHELL 3/24/1933 [id number]                     Emergency Contacts        (Rel.) Home Phone Work Phone Mobile Phone    MAYELIN CORONA (Daughter) -- -- [phone]    RUSTY YEN (Grandchild) -- -- [phone]                 Discharge Summary        La Dominique MD at 06/15/23 1141              [facility] Medicine Services  DISCHARGE TO INPATIENT HOSPICE    Patient Name: Justine Mitchell  : 3/24/1933  MRN: [id number]    Date of Admission: 2023  Date of " Discharge:  6/15  Primary Care Physician: Cindy Carmona MD    Consults       Date and Time Order Name Status Description    6/14/2023  4:01 PM Inpatient Palliative Care MD Consult Completed     6/14/2023  2:34 PM Inpatient Neurology Consult Stroke Completed           Hospital Course     Presenting Problem:   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.0]    Active Hospital Problems    Diagnosis  POA    **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.0]  Yes    Paroxysmal atrial fibrillation [I48.0]  Unknown    History of kyphoplasty [Z98.890]  Not Applicable    Degeneration of lumbar or lumbosacral intervertebral disc [M51.37]  Yes    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          Hospital Course:  Justine Mancia is a 89 y.o. female w Afib on chronic eliquis, dementia who presented w acute aphasia and right sided weakness. Found to have significant acute hemorrhagic CVA, family opted for comfort measures and transitioned to inpatient hospice on 6/15    Day of Discharge     HPI:   Granddaughter bedside as rest of family d/w hospice. Patient resting and appears comfortable    Vital Signs:   Temp:  [97.4 °F (36.3 °C)-97.6 °F (36.4 °C)] 97.4 °F (36.3 °C)  Heart Rate:  [90-97] 97  Resp:  [16] 16  BP: (121-150)/() 150/102     Physical Exam:  Elderly female lying in bed asleep  Resp rate slow, mild noise associated  No spontaneous movement appreciated    Discharge Details     Discharge Disposition:  Transfer care to inpatient Hospice at [facility]    Time Spent on Discharge:  10 minutes    La Dominique MD  06/15/23      Electronically signed by La Dominique MD at 06/15/23 8075

## 2024-03-16 NOTE — PROGRESS NOTES
Patient: Justine Mancia  : 3/24/1933  Chart #: [id number]    Date of Service: 2021    CHIEF COMPLAINT: back pain     History of Present Illness Ms. Mancia is an 87-year-old woman who is known to Dr. Pyle's service for undergoing decompressive laminectomies L3-4, and L4-5 on 2013.  Remotely, () she underwent a lumbar fusion by Dr Corley.  We do not have record of that.  More recently, she has developed increased low back pain x about 3 weeks.  No inciting accident or injury.  1 week ago symptoms became severe and she presented to the emergency department.  A CT scan reportedly showed a compression deformity at the L4 vertebral body.  She was sent home with pain medication.  Today she continues to complain of unremitting low back pain.  The pain medication diminishes symptoms.  She denies pain, numbness or weakness into the lower extremities.  Symptoms are worse with activity-specifically bending, twisting or moving from sitting to standing.  She feels better when lying down.  No bowel or bladder difficulties.  She has no known history of osteoporosis.    Past Medical History:   Diagnosis Date   • Arthritis    • Diabetes mellitus (CMS/HCC)    • Gout    • Hx of bladder infections    • Hypertension    • Low back pain    • UTI (urinary tract infection)          Current Outpatient Medications:   •  allopurinol (ZYLOPRIM) 100 MG tablet, , Disp: , Rfl:   •  apixaban (Eliquis) 5 MG tablet tablet, Every 12 (Twelve) Hours., Disp: , Rfl:   •  atorvastatin (Lipitor) 40 MG tablet, Lipitor 40 mg tablet  Take 1 tablet every day by oral route at bedtime., Disp: , Rfl:   •  glipizide (GLUCOTROL) 5 MG tablet, , Disp: , Rfl:   •  HYDROcodone-acetaminophen (NORCO) 5-325 MG per tablet, , Disp: , Rfl:   •  HYDROcodone-acetaminophen (NORCO) 5-325 MG per tablet, Take 1 tablet by mouth Every 8 (Eight) Hours As Needed for Moderate Pain ., Disp: 25 tablet, Rfl: 0  •  memantine (NAMENDA) 10 MG tablet, , Disp: , Rfl:   •   SW/CM Discharge Plan  Anticipate that patient will be medically cleared for discharge today. Patient’s discharge destination is home. Patient to be picked up by family. Patient/family aware and agreeable to discharge plan.  Discharge plan communicated to MD and RN.       BART confirmed with patient that his friend did get his prescription for xarelto from [facility] outpatient pharmacy yesterday. CM provided patient with a free 30 day trial card for xarelto.    Metoprolol Tartrate (LOPRESSOR PO), metoprolol tartrate  50 mg. daily, Disp: , Rfl:   •  nitrofurantoin (MACRODANTIN) 100 MG capsule, , Disp: , Rfl:   •  potassium chloride (MICRO-K) 10 MEQ CR capsule, potassium chloride  10 mg. daily, Disp: , Rfl:   •  predniSONE (DELTASONE) 10 MG tablet, , Disp: , Rfl:     Past Surgical History:   Procedure Laterality Date   • APPENDECTOMY     • CERVICAL FUSION  1980's   • LUMBAR FUSION  1970's    Dr. Corley   • LUMBAR LAMINECTOMY  2013    L3/4 and L4/5; Dr. Mars Pyle   • OTHER SURGICAL HISTORY      bone spur removal - foot       Social History     Socioeconomic History   • Marital status:      Spouse name: Not on file   • Number of children: Not on file   • Years of education: Not on file   • Highest education level: Not on file   Tobacco Use   • Smoking status: Never Smoker   • Smokeless tobacco: Never Used   Substance and Sexual Activity   • Alcohol use: Never   • Drug use: Never   • Sexual activity: Defer         Review of Systems   Constitutional: Positive for activity change. Negative for appetite change, chills, diaphoresis, fatigue, fever and unexpected weight change.   HENT: Negative for congestion, dental problem, drooling, ear discharge, ear pain, facial swelling, hearing loss, mouth sores, nosebleeds, postnasal drip, rhinorrhea, sinus pressure, sneezing, sore throat, tinnitus, trouble swallowing and voice change.    Eyes: Negative for photophobia, pain, discharge, redness, itching and visual disturbance.   Respiratory: Negative for apnea, cough, choking, chest tightness, shortness of breath, wheezing and stridor.    Cardiovascular: Negative for chest pain, palpitations and leg swelling.   Gastrointestinal: Negative for abdominal distention, abdominal pain, anal bleeding, blood in stool, constipation, diarrhea, nausea, rectal pain and vomiting.   Endocrine: Negative for cold intolerance, heat intolerance, polydipsia, polyphagia and polyuria.   Genitourinary:  "Negative for decreased urine volume, difficulty urinating, dysuria, enuresis, flank pain, frequency, genital sores, hematuria and urgency.   Musculoskeletal: Positive for back pain. Negative for arthralgias, gait problem, joint swelling, myalgias, neck pain and neck stiffness.   Skin: Negative for color change, pallor, rash and wound.   Allergic/Immunologic: Negative for environmental allergies, food allergies and immunocompromised state.   Neurological: Negative for dizziness, tremors, seizures, syncope, facial asymmetry, speech difficulty, weakness, light-headedness, numbness and headaches.   Hematological: Negative for adenopathy. Does not bruise/bleed easily.   Psychiatric/Behavioral: Positive for confusion. Negative for agitation, behavioral problems, decreased concentration, dysphoric mood, hallucinations, self-injury, sleep disturbance and suicidal ideas. The patient is not nervous/anxious and is not hyperactive.    All other systems reviewed and are negative.      Objective   Vital Signs: Blood pressure 142/92, temperature 96.9 °F (36.1 °C), temperature source Infrared, height 160 cm (63\"), weight 75.8 kg (167 lb).  Physical Exam  Vitals and nursing note reviewed.   Constitutional:       General: She is not in acute distress.     Appearance: She is well-developed.   HENT:      Head: Normocephalic and atraumatic.   Eyes:      Pupils: Pupils are equal, round, and reactive to light.   Cardiovascular:      Heart sounds: Normal heart sounds.   Pulmonary:      Breath sounds: Normal breath sounds.   Psychiatric:         Behavior: Behavior normal.         Thought Content: Thought content normal.     Musculoskeletal:     Strength is intact in upper and lower extremities to direct testing.     Station and gait are normal.  Neurologic:     Muscle tone is normal throughout.     Coordination is intact.     Patient is oriented to person, place, and time.         Independent review of radiographic imaging: no new " images    Assessment/Plan   Diagnosis: Suspected lumbar compression fracture     Medical Decision Making: I have referred patient for an MRI of the lumbar spine for suspected compression fracture.  We have discussed treatment options including giving this some time or proceeding with kyphoplasty.  She will follow-up with imaging and further recommendations will be made at that time.  I have provided her Norco 5 1 tab p.o. 3 times a day as needed until she is seen back in the office.        Diagnoses and all orders for this visit:    1. Compression fracture of L4 vertebra, initial encounter (CMS/[facility]) (Primary)  -     MRI Lumbar Spine Without Contrast; Future    2. Acute midline low back pain without sciatica                        Patient's Body mass index is 29.58 kg/m². BMI is above normal parameters. Recommendations include: exercise counseling and nutrition counseling.         Enid Prescott PA-C  Patient Care Team:  Amilcar Arroyo MD as PCP - General (Family Medicine)

## 2025-02-25 NOTE — TELEPHONE ENCOUNTER
Caller: MAYELIN CORONA    Relationship to patient: Emergency Contact    Best call back number: [phone]    Patient is needing:    REC'D CALL FROM PATIENT'S DAUGHTER, PATIENT IS IN SIGNIFICANT AMOUNT OF PAIN, 8-9/10.  PATIENT HAVING DIFFICULTY STANDING UPRIGHT, AND GETTING COMFORTABLE IN GENERAL FOR THE PAST FEW DAYS.    PATIENT HAS BEEN TAKING TYLENOL, WITH NO RELIEF.    PATIENT DID HAVE LUMBAR XR ON 12/30/22 @[facility]    WOULD LIKE TO KNOW IF SHE SHOULD BE SEEN, IF SO BY DR OR APC, OR IF ADVANCED IMAGING IS NEEDED.    PLEASE CALL TO ADVISE           Detail Level: Zone Hide Additional Notes?: No

## (undated) DEVICE — BONE BIOPSY DEVICE F05A BBD SIZE 3: Brand: MEDTRONIC REUSABLE INSTRUMENTS

## (undated) DEVICE — GLV SURG PREMIERPRO MIC LTX PF SZ7 BRN

## (undated) DEVICE — PK KYPHOPLASTY 10

## (undated) DEVICE — BONE TAMP KIT KPX203PB FF X2 20/3 1 STP: Brand: KYPHOPAK™ TRAY

## (undated) DEVICE — GLV SURG RAD SENSICARE SHLD PF LF SZ8 STRL

## (undated) DEVICE — CVR HNDL LIGHT RIGID

## (undated) DEVICE — APPL CHLORAPREP TINTED 26ML TEAL

## (undated) DEVICE — GLV SURG PREMIERPRO MIC LTX PF SZ6.5 BRN

## (undated) DEVICE — MIXER A07A CEMENT

## (undated) DEVICE — STRAP POSTN KN/BDY FM 5X72IN DISP

## (undated) DEVICE — GLV SURG PREMIERPRO MIC LTX PF SZ7.5 BRN